# Patient Record
Sex: FEMALE | Race: OTHER | HISPANIC OR LATINO | Employment: FULL TIME | URBAN - METROPOLITAN AREA
[De-identification: names, ages, dates, MRNs, and addresses within clinical notes are randomized per-mention and may not be internally consistent; named-entity substitution may affect disease eponyms.]

---

## 2017-05-03 ENCOUNTER — TRANSCRIBE ORDERS (OUTPATIENT)
Dept: ADMINISTRATIVE | Facility: HOSPITAL | Age: 57
End: 2017-05-03

## 2017-05-03 DIAGNOSIS — N20.0 KIDNEY STONE: Primary | ICD-10-CM

## 2017-05-08 ENCOUNTER — HOSPITAL ENCOUNTER (OUTPATIENT)
Dept: ULTRASOUND IMAGING | Facility: HOSPITAL | Age: 57
Discharge: HOME | End: 2017-05-08
Attending: UROLOGY
Payer: COMMERCIAL

## 2017-05-08 DIAGNOSIS — N20.0 KIDNEY STONE: ICD-10-CM

## 2017-05-08 PROCEDURE — 76770 US EXAM ABDO BACK WALL COMP: CPT

## 2017-09-29 ENCOUNTER — CONVERSION ENCOUNTER (OUTPATIENT)
Dept: MAMMOGRAPHY | Facility: CLINIC | Age: 57
End: 2017-09-29

## 2018-01-14 NOTE — PSYCH
Behavioral Health Outpatient Intake    Referred By: DR Josue Chaidez  Intake Questions: there are no developmental disabilities  the patient does not have a hearing impairment  the patient does not have an ICM or CTT  patient is not taking injectable psychiatric medications  Employment: The patient is employed full time at [employer]  Emergency Contact Information:   Emergency Contact: Roxianne Cooks   Phone Number: [phone]   Previous Psychiatric Treatment: She has previously been seen by a psychiatrist  Ebmolly Alvarez, 2 YRS AGO  She has not been previously seen by a therapist     History: no  service  She has not had combat service  She was not activated into federal active duty as a member of the [employer] or [employer]  Insurance Subscriber: [email or web address]   Primary Insurance: [employer]   ID number: [id number]   Group number: [id number]         Presenting Problem (in patient's words): INSOMNIA, DEPRESSION, ANXIETY  Substance Abuse: NONE  Previous Treatment: The patient has not been seen here in the past      Accepted as Patient   DR Guillermo Schneider 7/18/16 @ 2:00     Primary Care Physician: DR Camryn Weinstein   Electronically signed by : Shannan Bean, ; Jan 19 2016  2:12PM EST                       (Author)

## 2018-12-07 ENCOUNTER — TRANSCRIBE ORDERS (OUTPATIENT)
Dept: ADMINISTRATIVE | Facility: HOSPITAL | Age: 58
End: 2018-12-07

## 2018-12-07 DIAGNOSIS — Z12.39 SCREENING BREAST EXAMINATION: Primary | ICD-10-CM

## 2018-12-20 ENCOUNTER — HOSPITAL ENCOUNTER (OUTPATIENT)
Dept: MAMMOGRAPHY | Facility: CLINIC | Age: 58
Discharge: HOME | End: 2018-12-20
Payer: COMMERCIAL

## 2018-12-20 VITALS — HEIGHT: 60 IN | WEIGHT: 130 LBS | BODY MASS INDEX: 25.52 KG/M2

## 2018-12-20 DIAGNOSIS — Z12.39 SCREENING BREAST EXAMINATION: ICD-10-CM

## 2018-12-20 PROCEDURE — 77067 SCR MAMMO BI INCL CAD: CPT

## 2019-10-12 ENCOUNTER — APPOINTMENT (OUTPATIENT)
Dept: LAB | Facility: HOSPITAL | Age: 59
End: 2019-10-12
Payer: COMMERCIAL

## 2019-10-12 ENCOUNTER — TRANSCRIBE ORDERS (OUTPATIENT)
Dept: ADMINISTRATIVE | Facility: HOSPITAL | Age: 59
End: 2019-10-12

## 2019-10-12 ENCOUNTER — HOSPITAL ENCOUNTER (OUTPATIENT)
Dept: RADIOLOGY | Facility: HOSPITAL | Age: 59
Discharge: HOME | End: 2019-10-12
Payer: COMMERCIAL

## 2019-10-12 DIAGNOSIS — E11.9 DIABETES MELLITUS WITHOUT COMPLICATION (HCC): ICD-10-CM

## 2019-10-12 DIAGNOSIS — R05.9 COUGH: ICD-10-CM

## 2019-10-12 DIAGNOSIS — E78.5 HYPERLIPIDEMIA, UNSPECIFIED HYPERLIPIDEMIA TYPE: ICD-10-CM

## 2019-10-12 DIAGNOSIS — E11.9 DIABETES MELLITUS WITHOUT COMPLICATION (HCC): Primary | ICD-10-CM

## 2019-10-12 LAB
ALBUMIN SERPL BCP-MCNC: 4.4 G/DL (ref 3–5.2)
ALP SERPL-CCNC: 117 U/L (ref 43–122)
ALT SERPL W P-5'-P-CCNC: 32 U/L (ref 9–52)
ANION GAP SERPL CALCULATED.3IONS-SCNC: 7 MMOL/L (ref 5–14)
AST SERPL W P-5'-P-CCNC: 26 U/L (ref 14–36)
BILIRUB SERPL-MCNC: 0.6 MG/DL
BUN SERPL-MCNC: 14 MG/DL (ref 5–25)
CALCIUM SERPL-MCNC: 9.6 MG/DL (ref 8.4–10.2)
CHLORIDE SERPL-SCNC: 101 MMOL/L (ref 97–108)
CHOLEST SERPL-MCNC: 199 MG/DL
CO2 SERPL-SCNC: 33 MMOL/L (ref 22–30)
CREAT SERPL-MCNC: 0.67 MG/DL (ref 0.6–1.2)
EST. AVERAGE GLUCOSE BLD GHB EST-MCNC: 131 MG/DL
GFR SERPL CREATININE-BSD FRML MDRD: 98 ML/MIN/1.73SQ M
GLUCOSE P FAST SERPL-MCNC: 113 MG/DL (ref 70–99)
HBA1C MFR BLD: 6.2 % (ref 4.2–6.3)
HDLC SERPL-MCNC: 49 MG/DL (ref 40–59)
LDLC SERPL CALC-MCNC: 128 MG/DL
NONHDLC SERPL-MCNC: 150 MG/DL
POTASSIUM SERPL-SCNC: 4 MMOL/L (ref 3.6–5)
PROT SERPL-MCNC: 7.5 G/DL (ref 5.9–8.4)
SODIUM SERPL-SCNC: 141 MMOL/L (ref 137–147)
TRIGL SERPL-MCNC: 109 MG/DL

## 2019-10-12 PROCEDURE — 83036 HEMOGLOBIN GLYCOSYLATED A1C: CPT

## 2019-10-12 PROCEDURE — 71046 X-RAY EXAM CHEST 2 VIEWS: CPT

## 2019-10-12 PROCEDURE — 80061 LIPID PANEL: CPT

## 2019-10-12 PROCEDURE — 80053 COMPREHEN METABOLIC PANEL: CPT

## 2019-10-12 PROCEDURE — 36415 COLL VENOUS BLD VENIPUNCTURE: CPT

## 2019-10-26 ENCOUNTER — OCCMED (OUTPATIENT)
Dept: URGENT CARE | Age: 59
End: 2019-10-26

## 2019-10-26 ENCOUNTER — APPOINTMENT (OUTPATIENT)
Dept: LAB | Age: 59
End: 2019-10-26

## 2019-10-26 DIAGNOSIS — Z00.8 ENCOUNTER FOR BIOMETRIC SCREENING: ICD-10-CM

## 2019-10-26 DIAGNOSIS — Z00.8 ENCOUNTER FOR BIOMETRIC SCREENING: Primary | ICD-10-CM

## 2019-10-26 LAB
CHOLEST SERPL-MCNC: 195 MG/DL (ref 50–200)
GLUCOSE P FAST SERPL-MCNC: 101 MG/DL (ref 65–99)
HDLC SERPL-MCNC: 49 MG/DL
LDLC SERPL CALC-MCNC: 118 MG/DL (ref 0–100)
NONHDLC SERPL-MCNC: 146 MG/DL
TRIGL SERPL-MCNC: 142 MG/DL

## 2019-10-26 PROCEDURE — 80061 LIPID PANEL: CPT

## 2019-10-26 PROCEDURE — 80323 ALKALOIDS NOS: CPT

## 2019-10-26 PROCEDURE — 82947 ASSAY GLUCOSE BLOOD QUANT: CPT

## 2019-10-26 PROCEDURE — 36415 COLL VENOUS BLD VENIPUNCTURE: CPT

## 2019-10-30 LAB
COTININE SERPL-MCNC: NORMAL NG/ML
NICOTINE SERPL-MCNC: NORMAL NG/ML

## 2019-12-02 ENCOUNTER — TRANSCRIBE ORDERS (OUTPATIENT)
Dept: ADMINISTRATIVE | Facility: HOSPITAL | Age: 59
End: 2019-12-02

## 2019-12-02 DIAGNOSIS — Z12.31 ENCOUNTER FOR SCREENING MAMMOGRAM FOR MALIGNANT NEOPLASM OF BREAST: Primary | ICD-10-CM

## 2020-03-07 ENCOUNTER — HOSPITAL ENCOUNTER (OUTPATIENT)
Dept: MAMMOGRAPHY | Facility: CLINIC | Age: 60
Discharge: HOME | End: 2020-03-07
Payer: COMMERCIAL

## 2020-03-07 VITALS — WEIGHT: 130 LBS | BODY MASS INDEX: 25.52 KG/M2 | HEIGHT: 60 IN

## 2020-03-07 DIAGNOSIS — Z12.31 ENCOUNTER FOR SCREENING MAMMOGRAM FOR MALIGNANT NEOPLASM OF BREAST: ICD-10-CM

## 2020-03-07 PROCEDURE — 77067 SCR MAMMO BI INCL CAD: CPT

## 2020-03-07 PROCEDURE — 77063 BREAST TOMOSYNTHESIS BI: CPT

## 2021-06-15 ENCOUNTER — APPOINTMENT (EMERGENCY)
Dept: CT IMAGING | Facility: HOSPITAL | Age: 61
End: 2021-06-15
Payer: COMMERCIAL

## 2021-06-15 ENCOUNTER — HOSPITAL ENCOUNTER (EMERGENCY)
Facility: HOSPITAL | Age: 61
Discharge: HOME | End: 2021-06-15
Attending: EMERGENCY MEDICINE | Admitting: EMERGENCY MEDICINE
Payer: COMMERCIAL

## 2021-06-15 VITALS
DIASTOLIC BLOOD PRESSURE: 58 MMHG | OXYGEN SATURATION: 96 % | TEMPERATURE: 98.4 F | HEART RATE: 74 BPM | SYSTOLIC BLOOD PRESSURE: 114 MMHG | RESPIRATION RATE: 18 BRPM

## 2021-06-15 DIAGNOSIS — R10.30 LOWER ABDOMINAL PAIN: Primary | ICD-10-CM

## 2021-06-15 LAB
ALBUMIN SERPL BCP-MCNC: 3.8 G/DL (ref 3.5–5)
ALP SERPL-CCNC: 159 U/L (ref 46–116)
ALT SERPL W P-5'-P-CCNC: 36 U/L (ref 12–78)
ANION GAP SERPL CALCULATED.3IONS-SCNC: 12 MMOL/L (ref 4–13)
AST SERPL W P-5'-P-CCNC: 20 U/L (ref 5–45)
BASOPHILS # BLD AUTO: 0.06 THOUSANDS/ΜL (ref 0–0.1)
BASOPHILS NFR BLD AUTO: 1 % (ref 0–1)
BILIRUB SERPL-MCNC: 0.26 MG/DL (ref 0.2–1)
BILIRUB UR QL STRIP: NEGATIVE
BUN SERPL-MCNC: 11 MG/DL (ref 5–25)
CALCIUM SERPL-MCNC: 9.8 MG/DL (ref 8.3–10.1)
CHLORIDE SERPL-SCNC: 103 MMOL/L (ref 100–108)
CLARITY UR: CLEAR
CO2 SERPL-SCNC: 27 MMOL/L (ref 21–32)
COLOR UR: YELLOW
CREAT SERPL-MCNC: 0.74 MG/DL (ref 0.6–1.3)
EOSINOPHIL # BLD AUTO: 0.36 THOUSAND/ΜL (ref 0–0.61)
EOSINOPHIL NFR BLD AUTO: 5 % (ref 0–6)
ERYTHROCYTE [DISTWIDTH] IN BLOOD BY AUTOMATED COUNT: 13.5 % (ref 11.6–15.1)
GFR SERPL CREATININE-BSD FRML MDRD: 89 ML/MIN/1.73SQ M
GLUCOSE SERPL-MCNC: 95 MG/DL (ref 65–140)
GLUCOSE UR STRIP-MCNC: NEGATIVE MG/DL
HCT VFR BLD AUTO: 42 % (ref 34.8–46.1)
HGB BLD-MCNC: 13.4 G/DL (ref 11.5–15.4)
HGB UR QL STRIP.AUTO: NEGATIVE
IMM GRANULOCYTES # BLD AUTO: 0.03 THOUSAND/UL (ref 0–0.2)
IMM GRANULOCYTES NFR BLD AUTO: 0 % (ref 0–2)
KETONES UR STRIP-MCNC: NEGATIVE MG/DL
LEUKOCYTE ESTERASE UR QL STRIP: NEGATIVE
LIPASE SERPL-CCNC: 102 U/L (ref 73–393)
LYMPHOCYTES # BLD AUTO: 2.97 THOUSANDS/ΜL (ref 0.6–4.47)
LYMPHOCYTES NFR BLD AUTO: 38 % (ref 14–44)
MCH RBC QN AUTO: 27.6 PG (ref 26.8–34.3)
MCHC RBC AUTO-ENTMCNC: 31.9 G/DL (ref 31.4–37.4)
MCV RBC AUTO: 86 FL (ref 82–98)
MONOCYTES # BLD AUTO: 0.46 THOUSAND/ΜL (ref 0.17–1.22)
MONOCYTES NFR BLD AUTO: 6 % (ref 4–12)
NEUTROPHILS # BLD AUTO: 3.95 THOUSANDS/ΜL (ref 1.85–7.62)
NEUTS SEG NFR BLD AUTO: 50 % (ref 43–75)
NITRITE UR QL STRIP: NEGATIVE
NRBC BLD AUTO-RTO: 0 /100 WBCS
PH UR STRIP.AUTO: 7 [PH] (ref 4.5–8)
PLATELET # BLD AUTO: 333 THOUSANDS/UL (ref 149–390)
PMV BLD AUTO: 10.2 FL (ref 8.9–12.7)
POTASSIUM SERPL-SCNC: 3.7 MMOL/L (ref 3.5–5.3)
PROT SERPL-MCNC: 7.4 G/DL (ref 6.4–8.2)
PROT UR STRIP-MCNC: NEGATIVE MG/DL
RBC # BLD AUTO: 4.86 MILLION/UL (ref 3.81–5.12)
SODIUM SERPL-SCNC: 142 MMOL/L (ref 136–145)
SP GR UR STRIP.AUTO: 1.02 (ref 1–1.03)
UROBILINOGEN UR QL STRIP.AUTO: 1 E.U./DL
WBC # BLD AUTO: 7.83 THOUSAND/UL (ref 4.31–10.16)

## 2021-06-15 PROCEDURE — 85025 COMPLETE CBC W/AUTO DIFF WBC: CPT | Performed by: PHYSICIAN ASSISTANT

## 2021-06-15 PROCEDURE — 81003 URINALYSIS AUTO W/O SCOPE: CPT

## 2021-06-15 PROCEDURE — 74177 CT ABD & PELVIS W/CONTRAST: CPT

## 2021-06-15 PROCEDURE — 83690 ASSAY OF LIPASE: CPT | Performed by: PHYSICIAN ASSISTANT

## 2021-06-15 PROCEDURE — 99284 EMERGENCY DEPT VISIT MOD MDM: CPT

## 2021-06-15 PROCEDURE — G1004 CDSM NDSC: HCPCS

## 2021-06-15 PROCEDURE — 96375 TX/PRO/DX INJ NEW DRUG ADDON: CPT

## 2021-06-15 PROCEDURE — 99284 EMERGENCY DEPT VISIT MOD MDM: CPT | Performed by: PHYSICIAN ASSISTANT

## 2021-06-15 PROCEDURE — 96361 HYDRATE IV INFUSION ADD-ON: CPT

## 2021-06-15 PROCEDURE — 36415 COLL VENOUS BLD VENIPUNCTURE: CPT | Performed by: PHYSICIAN ASSISTANT

## 2021-06-15 PROCEDURE — 80053 COMPREHEN METABOLIC PANEL: CPT | Performed by: PHYSICIAN ASSISTANT

## 2021-06-15 PROCEDURE — 96374 THER/PROPH/DIAG INJ IV PUSH: CPT

## 2021-06-15 RX ORDER — ALBUTEROL SULFATE 90 UG/1
2 AEROSOL, METERED RESPIRATORY (INHALATION) EVERY 4 HOURS
COMMUNITY
Start: 2021-02-11

## 2021-06-15 RX ORDER — ONDANSETRON 2 MG/ML
4 INJECTION INTRAMUSCULAR; INTRAVENOUS ONCE
Status: COMPLETED | OUTPATIENT
Start: 2021-06-15 | End: 2021-06-15

## 2021-06-15 RX ORDER — DICYCLOMINE HCL 20 MG
20 TABLET ORAL EVERY 6 HOURS
Qty: 20 TABLET | Refills: 0 | Status: SHIPPED | OUTPATIENT
Start: 2021-06-15 | End: 2022-05-24

## 2021-06-15 RX ORDER — DICYCLOMINE HCL 20 MG
20 TABLET ORAL ONCE
Status: COMPLETED | OUTPATIENT
Start: 2021-06-15 | End: 2021-06-15

## 2021-06-15 RX ORDER — TRIAMTERENE AND HYDROCHLOROTHIAZIDE 37.5; 25 MG/1; MG/1
1 CAPSULE ORAL DAILY
COMMUNITY
Start: 2015-02-23

## 2021-06-15 RX ORDER — OMEPRAZOLE 20 MG/1
20 CAPSULE, DELAYED RELEASE ORAL DAILY
COMMUNITY
Start: 2021-04-23

## 2021-06-15 RX ORDER — ATORVASTATIN CALCIUM 40 MG/1
40 TABLET, FILM COATED ORAL EVERY EVENING
COMMUNITY
Start: 2020-12-29

## 2021-06-15 RX ORDER — MORPHINE SULFATE 4 MG/ML
4 INJECTION, SOLUTION INTRAMUSCULAR; INTRAVENOUS ONCE
Status: COMPLETED | OUTPATIENT
Start: 2021-06-15 | End: 2021-06-15

## 2021-06-15 RX ADMIN — IOHEXOL 100 ML: 350 INJECTION, SOLUTION INTRAVENOUS at 14:24

## 2021-06-15 RX ADMIN — MORPHINE SULFATE 4 MG: 4 INJECTION INTRAVENOUS at 13:05

## 2021-06-15 RX ADMIN — DICYCLOMINE HYDROCHLORIDE 20 MG: 20 TABLET ORAL at 15:21

## 2021-06-15 RX ADMIN — SODIUM CHLORIDE 1000 ML: 0.9 INJECTION, SOLUTION INTRAVENOUS at 13:03

## 2021-06-15 RX ADMIN — ONDANSETRON 4 MG: 2 INJECTION INTRAMUSCULAR; INTRAVENOUS at 13:04

## 2021-06-15 NOTE — DISCHARGE INSTRUCTIONS
Results for orders placed or performed during the hospital encounter of 06/15/21   CBC and differential   Result Value Ref Range    WBC 7 83 4 31 - 10 16 Thousand/uL    RBC 4 86 3 81 - 5 12 Million/uL    Hemoglobin 13 4 11 5 - 15 4 g/dL    Hematocrit 42 0 34 8 - 46 1 %    MCV 86 82 - 98 fL    MCH 27 6 26 8 - 34 3 pg    MCHC 31 9 31 4 - 37 4 g/dL    RDW 13 5 11 6 - 15 1 %    MPV 10 2 8 9 - 12 7 fL    Platelets 677 240 - 960 Thousands/uL    nRBC 0 /100 WBCs    Neutrophils Relative 50 43 - 75 %    Immat GRANS % 0 0 - 2 %    Lymphocytes Relative 38 14 - 44 %    Monocytes Relative 6 4 - 12 %    Eosinophils Relative 5 0 - 6 %    Basophils Relative 1 0 - 1 %    Neutrophils Absolute 3 95 1 85 - 7 62 Thousands/µL    Immature Grans Absolute 0 03 0 00 - 0 20 Thousand/uL    Lymphocytes Absolute 2 97 0 60 - 4 47 Thousands/µL    Monocytes Absolute 0 46 0 17 - 1 22 Thousand/µL    Eosinophils Absolute 0 36 0 00 - 0 61 Thousand/µL    Basophils Absolute 0 06 0 00 - 0 10 Thousands/µL   CMP   Result Value Ref Range    Sodium 142 136 - 145 mmol/L    Potassium 3 7 3 5 - 5 3 mmol/L    Chloride 103 100 - 108 mmol/L    CO2 27 21 - 32 mmol/L    ANION GAP 12 4 - 13 mmol/L    BUN 11 5 - 25 mg/dL    Creatinine 0 74 0 60 - 1 30 mg/dL    Glucose 95 65 - 140 mg/dL    Calcium 9 8 8 3 - 10 1 mg/dL    AST 20 5 - 45 U/L    ALT 36 12 - 78 U/L    Alkaline Phosphatase 159 (H) 46 - 116 U/L    Total Protein 7 4 6 4 - 8 2 g/dL    Albumin 3 8 3 5 - 5 0 g/dL    Total Bilirubin 0 26 0 20 - 1 00 mg/dL    eGFR 89 ml/min/1 73sq m   Lipase   Result Value Ref Range    Lipase 102 73 - 393 u/L   Urine Macroscopic, POC   Result Value Ref Range    Color, UA Yellow     Clarity, UA Clear     pH, UA 7 0 4 5 - 8 0    Leukocytes, UA Negative Negative    Nitrite, UA Negative Negative    Protein, UA Negative Negative mg/dl    Glucose, UA Negative Negative mg/dl    Ketones, UA Negative Negative mg/dl    Urobilinogen, UA 1 0 0 2, 1 0 E U /dl E U /dl    Bilirubin, UA Negative Negative    Blood, UA Negative Negative    Specific Gravity, UA 1 020 1 003 - 1 030     CT Abdomen pelvis with contrast   Final Result   Addendum 1 of 1   ADDENDUM: Please note there is an error in the original report  The    correct finding is as follows:      REPRODUCTIVE ORGANS: Hysterectomy  No adnexal masses  Final      No acute findings to explain pain  Nonemergent findings above              Workstation performed: [id number]

## 2021-06-15 NOTE — Clinical Note
Adan Cantu was seen and treated in our emergency department on 6/15/2021  Diagnosis:     Sonal  may return to work on return date  She may return on this date: 06/17/2021         If you have any questions or concerns, please don't hesitate to call        Nicole Em PA-C    ______________________________           _______________          _______________  Hospital Representative                              Date                                Time

## 2021-06-15 NOTE — Clinical Note
Tracy Cast was seen and treated in our emergency department on 6/15/2021  Diagnosis:     Sydni Cornejo  may return to work on return date  She may return on this date: 06/17/2021         If you have any questions or concerns, please don't hesitate to call        Mari Perez PA-C    ______________________________           _______________          _______________  Hospital Representative                              Date                                Time

## 2021-06-15 NOTE — Clinical Note
Abe Tsai was seen and treated in our emergency department on 6/15/2021  Diagnosis:     Jocelyn Todd  may return to work on return date  She may return on this date: 06/17/2021         If you have any questions or concerns, please don't hesitate to call        Leda Fraser PA-C    ______________________________           _______________          _______________  Hospital Representative                              Date                                Time

## 2021-06-16 NOTE — ED PROVIDER NOTES
History  Chief Complaint   Patient presents with    Abdominal Pain     lower abd pain x days radiating into right flank and right upper quadrant, reports dizziness and inability to stand up straight, +diarrhea       Abdominal Pain  Pain location:  RLQ, LLQ and suprapubic  Pain quality: aching and cramping    Pain severity:  Moderate  Onset quality:  Gradual  Duration:  3 days  Timing:  Constant  Progression:  Unchanged  Chronicity:  New  Context: not alcohol use, not diet changes, not eating, not laxative use, not previous surgeries, not recent illness, not recent sexual activity, not retching, not sick contacts, not suspicious food intake and not trauma    Relieved by:  Nothing  Worsened by:  Nothing  Ineffective treatments:  None tried  Associated symptoms: diarrhea and nausea    Associated symptoms: no chest pain, no chills, no dysuria, no fatigue, no fever, no shortness of breath, no sore throat, no vaginal bleeding and no vomiting    Risk factors: no aspirin use, has not had multiple surgeries and no NSAID use        Prior to Admission Medications   Prescriptions Last Dose Informant Patient Reported? Taking?    Escitalopram Oxalate (LEXAPRO PO)   Yes Yes   Sig: Take by mouth   albuterol (ProAir HFA) 90 mcg/act inhaler   Yes Yes   Sig: Inhale 2 puffs every 4 (four) hours   atorvastatin (LIPITOR) 40 mg tablet   Yes Yes   Sig: Take 40 mg by mouth every evening   metFORMIN (GLUCOPHAGE) 500 mg tablet   Yes No   Sig: Take 500 mg by mouth daily   omeprazole (PriLOSEC) 20 mg delayed release capsule   Yes Yes   Sig: Take 20 mg by mouth daily   triamterene-hydrochlorothiazide (Dyazide) 37 5-25 mg per capsule   Yes Yes   Sig: Take 1 capsule by mouth daily      Facility-Administered Medications: None       Past Medical History:   Diagnosis Date    Diabetes mellitus ([facility] [address] )     Diverticulosis     HLD (hyperlipidemia)     Hypertension        Past Surgical History:   Procedure Laterality Date    APPENDECTOMY      CHOLECYSTECTOMY      HYSTERECTOMY  2001    LIVER SURGERY      OOPHORECTOMY Left 2001    REPLACEMENT TOTAL KNEE         Family History   Problem Relation Age of Onset    Prostate cancer Father 67    Prostate cancer Paternal Grandfather     Breast cancer Paternal Aunt     Prostate cancer Paternal Uncle     Prostate cancer Paternal Uncle     Cancer Paternal Aunt         blood cancer     I have reviewed and agree with the history as documented  E-Cigarette/Vaping    E-Cigarette Use Never User      E-Cigarette/Vaping Substances     Social History     Tobacco Use    Smoking status: Never Smoker    Smokeless tobacco: Never Used   Vaping Use    Vaping Use: Never used   Substance Use Topics    Alcohol use: Not Currently    Drug use: Not Currently       Review of Systems   Constitutional: Negative for chills, fatigue and fever  HENT: Negative for sore throat  Respiratory: Negative for shortness of breath  Cardiovascular: Negative for chest pain  Gastrointestinal: Positive for abdominal pain, diarrhea and nausea  Negative for vomiting  Genitourinary: Negative for dysuria and vaginal bleeding  Physical Exam  Physical Exam  Vitals and nursing note reviewed  Constitutional:       General: She is not in acute distress  Appearance: Normal appearance  She is not ill-appearing, toxic-appearing or diaphoretic  HENT:      Head: Normocephalic and atraumatic  Mouth/Throat:      Mouth: Mucous membranes are moist    Eyes:      General: No scleral icterus  Pupils: Pupils are equal, round, and reactive to light  Cardiovascular:      Rate and Rhythm: Normal rate and regular rhythm  Pulses: Normal pulses  Heart sounds: Normal heart sounds  Pulmonary:      Effort: Pulmonary effort is normal       Breath sounds: Normal breath sounds  Abdominal:      General: Abdomen is flat  There is no distension  Palpations: Abdomen is soft  Tenderness:  There is abdominal tenderness in the right lower quadrant, suprapubic area and left lower quadrant  There is no right CVA tenderness, left CVA tenderness, guarding or rebound  Negative signs include Irwin's sign and McBurney's sign  Hernia: No hernia is present  Musculoskeletal:         General: No swelling or tenderness  Normal range of motion  Cervical back: Normal range of motion  Skin:     General: Skin is warm  Capillary Refill: Capillary refill takes less than 2 seconds  Neurological:      General: No focal deficit present  Mental Status: She is alert     Psychiatric:         Mood and Affect: Mood normal          Vital Signs  ED Triage Vitals   Temperature Pulse Respirations Blood Pressure SpO2   06/15/21 1306 06/15/21 1247 06/15/21 1247 06/15/21 1247 06/15/21 1247   98 4 °F (36 9 °C) 88 18 146/69 97 %      Temp Source Heart Rate Source Patient Position - Orthostatic VS BP Location FiO2 (%)   06/15/21 1306 06/15/21 1247 06/15/21 1454 06/15/21 1454 --   Oral Monitor Lying Left arm       Pain Score       06/15/21 1305       Worst Possible Pain           Vitals:    06/15/21 1247 06/15/21 1454   BP: 146/69 114/58   Pulse: 88 74   Patient Position - Orthostatic VS:  Lying         Visual Acuity      ED Medications  Medications   morphine (PF) 4 mg/mL injection 4 mg (4 mg Intravenous Given 6/15/21 1305)   ondansetron (ZOFRAN) injection 4 mg (4 mg Intravenous Given 6/15/21 1304)   sodium chloride 0 9 % bolus 1,000 mL (0 mL Intravenous Stopped 6/15/21 1417)   iohexol (OMNIPAQUE) 350 MG/ML injection (SINGLE-DOSE) 100 mL (100 mL Intravenous Given 6/15/21 1424)   dicyclomine (BENTYL) tablet 20 mg (20 mg Oral Given 6/15/21 1521)       Diagnostic Studies  Results Reviewed     Procedure Component Value Units Date/Time    Urine Macroscopic, POC [[id number]] Collected: 06/15/21 1413    Lab Status: Final result Specimen: Urine Updated: 06/15/21 1418     Color, UA Yellow     Clarity, UA Clear     pH, UA 7 0     Leukocytes, UA Negative     Nitrite, UA Negative     Protein, UA Negative mg/dl      Glucose, UA Negative mg/dl      Ketones, UA Negative mg/dl      Urobilinogen, UA 1 0 E U /dl      Bilirubin, UA Negative     Blood, UA Negative     Specific Gravity, UA 1 020    Narrative:      CLINITEK RESULT    CMP [[id number]]  (Abnormal) Collected: 06/15/21 1303    Lab Status: Final result Specimen: Blood from Arm, Right Updated: 06/15/21 1329     Sodium 142 mmol/L      Potassium 3 7 mmol/L      Chloride 103 mmol/L      CO2 27 mmol/L      ANION GAP 12 mmol/L      BUN 11 mg/dL      Creatinine 0 74 mg/dL      Glucose 95 mg/dL      Calcium 9 8 mg/dL      AST 20 U/L      ALT 36 U/L      Alkaline Phosphatase 159 U/L      Total Protein 7 4 g/dL      Albumin 3 8 g/dL      Total Bilirubin 0 26 mg/dL      eGFR 89 ml/min/1 73sq m     Narrative:      [place] guidelines for Chronic Kidney Disease (CKD):     Stage 1 with normal or high GFR (GFR > 90 mL/min/1 73 square meters)    Stage 2 Mild CKD (GFR = 60-89 mL/min/1 73 square meters)    Stage 3A Moderate CKD (GFR = 45-59 mL/min/1 73 square meters)    Stage 3B Moderate CKD (GFR = 30-44 mL/min/1 73 square meters)    Stage 4 Severe CKD (GFR = 15-29 mL/min/1 73 square meters)    Stage 5 End Stage CKD (GFR <15 mL/min/1 73 square meters)  Note: GFR calculation is accurate only with a steady state creatinine    Lipase [[id number]]  (Normal) Collected: 06/15/21 1303    Lab Status: Final result Specimen: Blood from Arm, Right Updated: 06/15/21 1329     Lipase 102 u/L     CBC and differential [[id number]] Collected: 06/15/21 1303    Lab Status: Final result Specimen: Blood from Arm, Right Updated: 06/15/21 1312     WBC 7 83 Thousand/uL      RBC 4 86 Million/uL      Hemoglobin 13 4 g/dL      Hematocrit 42 0 %      MCV 86 fL      MCH 27 6 pg      MCHC 31 9 g/dL      RDW 13 5 %      MPV 10 2 fL      Platelets 367 Thousands/uL      nRBC 0 /100 WBCs      Neutrophils Relative 50 % Immat GRANS % 0 %      Lymphocytes Relative 38 %      Monocytes Relative 6 %      Eosinophils Relative 5 %      Basophils Relative 1 %      Neutrophils Absolute 3 95 Thousands/µL      Immature Grans Absolute 0 03 Thousand/uL      Lymphocytes Absolute 2 97 Thousands/µL      Monocytes Absolute 0 46 Thousand/µL      Eosinophils Absolute 0 36 Thousand/µL      Basophils Absolute 0 06 Thousands/µL                  CT Abdomen pelvis with contrast   Final Result by Rubens Acuna MD (06/15 1521)   Addendum 1 of 1 by Rubens Acuna MD (06/15 1521)   ADDENDUM: Please note there is an error in the original report  The    correct finding is as follows:      REPRODUCTIVE ORGANS: Hysterectomy  No adnexal masses  Final      No acute findings to explain pain  Nonemergent findings above  Workstation performed: [id number]                    Procedures  Procedures         ED Course  ED Course as of Jun 16 0609   Tue Oni 15, 2021   1329 Alk phos 159, lipase of 102                                SBIRT 20yo+      Most Recent Value   SBIRT (25 yo +)   In order to provide better care to our patients, we are screening all of our patients for alcohol and drug use  Would it be okay to ask you these screening questions? No Filed at: 06/15/2021 1254                    MDM  Number of Diagnoses or Management Options  Lower abdominal pain: new and requires workup  Diagnosis management comments: The patient was seen and examined  Laboratory studies revealed no leukocytosis   Imaging revealed no abnormalities  The patient was treated with morphine, zofran, bentyl  The patient was re-examined after treatment and disposition of discharge to home with bentyl and outpatient GI follow up was made  The test results were discussed with the patient/family  All questions were answered to patient/family's satisfaction  Anticipatory guidance and return precautions were discussed at length    They verbalized understanding and agreement with the plan  The patient remained stable while under my care in the Emergency Department  Amount and/or Complexity of Data Reviewed  Clinical lab tests: ordered and reviewed  Tests in the radiology section of CPT®: ordered and reviewed  Review and summarize past medical records: yes  Discuss the patient with other providers: yes    Risk of Complications, Morbidity, and/or Mortality  Presenting problems: moderate  Diagnostic procedures: high  Management options: moderate    Patient Progress  Patient progress: improved      Disposition  Final diagnoses:   Lower abdominal pain     Time reflects when diagnosis was documented in both MDM as applicable and the Disposition within this note     Time User Action Codes Description Comment    6/15/2021  3:32 PM Areta Money Add [R10 30] Lower abdominal pain       ED Disposition     ED Disposition Condition Date/Time Comment    Discharge Stable Tue Oni 15, 2021  3:33 PM William Lizama discharge to home/self care              Follow-up Information     Follow up With Specialties Details Why Contact Info    Joselin Laguerre MD Family Medicine   [facility] [address]   [phone]            Discharge Medication List as of 6/15/2021  3:38 PM      START taking these medications    Details   dicyclomine (BENTYL) 20 mg tablet Take 1 tablet (20 mg total) by mouth every 6 (six) hours for 7 days, Starting Tue 6/15/2021, Until Tue 6/22/2021, Normal         CONTINUE these medications which have NOT CHANGED    Details   albuterol (ProAir HFA) 90 mcg/act inhaler Inhale 2 puffs every 4 (four) hours, Starting Thu 2/11/2021, Historical Med      atorvastatin (LIPITOR) 40 mg tablet Take 40 mg by mouth every evening, Starting Tue 12/29/2020, Historical Med      Escitalopram Oxalate (LEXAPRO PO) Take by mouth, Historical Med      omeprazole (PriLOSEC) 20 mg delayed release capsule Take 20 mg by mouth daily, Starting Fri 4/23/2021, Historical Med triamterene-hydrochlorothiazide (Dyazide) 37 5-25 mg per capsule Take 1 capsule by mouth daily, Starting Mon 2/23/2015, Historical Med      metFORMIN (GLUCOPHAGE) 500 mg tablet Take 500 mg by mouth daily, Historical Med           No discharge procedures on file      PDMP Review     None          ED Provider  Electronically Signed by           Nanine Gowers, PA-C  06/16/21 8978

## 2021-10-21 ENCOUNTER — APPOINTMENT (EMERGENCY)
Dept: CT IMAGING | Facility: HOSPITAL | Age: 61
End: 2021-10-21
Payer: COMMERCIAL

## 2021-10-21 ENCOUNTER — APPOINTMENT (EMERGENCY)
Dept: RADIOLOGY | Facility: HOSPITAL | Age: 61
End: 2021-10-21
Payer: COMMERCIAL

## 2021-10-21 ENCOUNTER — HOSPITAL ENCOUNTER (EMERGENCY)
Facility: HOSPITAL | Age: 61
Discharge: HOME | End: 2021-10-22
Attending: EMERGENCY MEDICINE | Admitting: EMERGENCY MEDICINE
Payer: COMMERCIAL

## 2021-10-21 VITALS
OXYGEN SATURATION: 95 % | TEMPERATURE: 98.1 F | HEART RATE: 85 BPM | DIASTOLIC BLOOD PRESSURE: 75 MMHG | RESPIRATION RATE: 16 BRPM | BODY MASS INDEX: 28.85 KG/M2 | WEIGHT: 147.71 LBS | SYSTOLIC BLOOD PRESSURE: 145 MMHG

## 2021-10-21 DIAGNOSIS — R07.89 MUSCULOSKELETAL CHEST PAIN: Primary | ICD-10-CM

## 2021-10-21 LAB
ALBUMIN SERPL BCP-MCNC: 4 G/DL (ref 3.5–5)
ALP SERPL-CCNC: 135 U/L (ref 46–116)
ALT SERPL W P-5'-P-CCNC: 51 U/L (ref 12–78)
ANION GAP SERPL CALCULATED.3IONS-SCNC: 10 MMOL/L (ref 4–13)
AST SERPL W P-5'-P-CCNC: 29 U/L (ref 5–45)
ATRIAL RATE: 84 BPM
BASOPHILS # BLD AUTO: 0.08 THOUSANDS/ΜL (ref 0–0.1)
BASOPHILS NFR BLD AUTO: 1 % (ref 0–1)
BILIRUB SERPL-MCNC: 0.21 MG/DL (ref 0.2–1)
BUN SERPL-MCNC: 18 MG/DL (ref 5–25)
CALCIUM SERPL-MCNC: 9.3 MG/DL (ref 8.3–10.1)
CHLORIDE SERPL-SCNC: 102 MMOL/L (ref 100–108)
CO2 SERPL-SCNC: 28 MMOL/L (ref 21–32)
CREAT SERPL-MCNC: 0.74 MG/DL (ref 0.6–1.3)
D DIMER PPP FEU-MCNC: 1.29 UG/ML FEU
EOSINOPHIL # BLD AUTO: 0.4 THOUSAND/ΜL (ref 0–0.61)
EOSINOPHIL NFR BLD AUTO: 4 % (ref 0–6)
ERYTHROCYTE [DISTWIDTH] IN BLOOD BY AUTOMATED COUNT: 13.4 % (ref 11.6–15.1)
GFR SERPL CREATININE-BSD FRML MDRD: 89 ML/MIN/1.73SQ M
GLUCOSE SERPL-MCNC: 151 MG/DL (ref 65–140)
HCT VFR BLD AUTO: 42.7 % (ref 34.8–46.1)
HGB BLD-MCNC: 13.6 G/DL (ref 11.5–15.4)
IMM GRANULOCYTES # BLD AUTO: 0.02 THOUSAND/UL (ref 0–0.2)
IMM GRANULOCYTES NFR BLD AUTO: 0 % (ref 0–2)
LIPASE SERPL-CCNC: 133 U/L (ref 73–393)
LYMPHOCYTES # BLD AUTO: 3.63 THOUSANDS/ΜL (ref 0.6–4.47)
LYMPHOCYTES NFR BLD AUTO: 36 % (ref 14–44)
MCH RBC QN AUTO: 27.4 PG (ref 26.8–34.3)
MCHC RBC AUTO-ENTMCNC: 31.9 G/DL (ref 31.4–37.4)
MCV RBC AUTO: 86 FL (ref 82–98)
MONOCYTES # BLD AUTO: 0.68 THOUSAND/ΜL (ref 0.17–1.22)
MONOCYTES NFR BLD AUTO: 7 % (ref 4–12)
NEUTROPHILS # BLD AUTO: 5.26 THOUSANDS/ΜL (ref 1.85–7.62)
NEUTS SEG NFR BLD AUTO: 52 % (ref 43–75)
NRBC BLD AUTO-RTO: 0 /100 WBCS
P AXIS: 46 DEGREES
PLATELET # BLD AUTO: 320 THOUSANDS/UL (ref 149–390)
PMV BLD AUTO: 10.3 FL (ref 8.9–12.7)
POTASSIUM SERPL-SCNC: 3.5 MMOL/L (ref 3.5–5.3)
PR INTERVAL: 136 MS
PROT SERPL-MCNC: 7.9 G/DL (ref 6.4–8.2)
QRS AXIS: 74 DEGREES
QRSD INTERVAL: 102 MS
QT INTERVAL: 390 MS
QTC INTERVAL: 460 MS
RBC # BLD AUTO: 4.97 MILLION/UL (ref 3.81–5.12)
SODIUM SERPL-SCNC: 140 MMOL/L (ref 136–145)
T WAVE AXIS: 47 DEGREES
TROPONIN I SERPL-MCNC: <0.02 NG/ML
VENTRICULAR RATE: 84 BPM
WBC # BLD AUTO: 10.07 THOUSAND/UL (ref 4.31–10.16)

## 2021-10-21 PROCEDURE — 83690 ASSAY OF LIPASE: CPT

## 2021-10-21 PROCEDURE — 36415 COLL VENOUS BLD VENIPUNCTURE: CPT

## 2021-10-21 PROCEDURE — 85025 COMPLETE CBC W/AUTO DIFF WBC: CPT

## 2021-10-21 PROCEDURE — 71275 CT ANGIOGRAPHY CHEST: CPT

## 2021-10-21 PROCEDURE — 85379 FIBRIN DEGRADATION QUANT: CPT

## 2021-10-21 PROCEDURE — 99284 EMERGENCY DEPT VISIT MOD MDM: CPT

## 2021-10-21 PROCEDURE — 93005 ELECTROCARDIOGRAM TRACING: CPT

## 2021-10-21 PROCEDURE — 84484 ASSAY OF TROPONIN QUANT: CPT

## 2021-10-21 PROCEDURE — 96374 THER/PROPH/DIAG INJ IV PUSH: CPT

## 2021-10-21 PROCEDURE — 80053 COMPREHEN METABOLIC PANEL: CPT

## 2021-10-21 PROCEDURE — 99285 EMERGENCY DEPT VISIT HI MDM: CPT | Performed by: EMERGENCY MEDICINE

## 2021-10-21 PROCEDURE — 74177 CT ABD & PELVIS W/CONTRAST: CPT

## 2021-10-21 PROCEDURE — 71045 X-RAY EXAM CHEST 1 VIEW: CPT

## 2021-10-21 PROCEDURE — 93010 ELECTROCARDIOGRAM REPORT: CPT | Performed by: INTERNAL MEDICINE

## 2021-10-21 RX ORDER — MAGNESIUM HYDROXIDE/ALUMINUM HYDROXICE/SIMETHICONE 120; 1200; 1200 MG/30ML; MG/30ML; MG/30ML
30 SUSPENSION ORAL ONCE
Status: COMPLETED | OUTPATIENT
Start: 2021-10-21 | End: 2021-10-21

## 2021-10-21 RX ORDER — MAGNESIUM SULFATE HEPTAHYDRATE 40 MG/ML
2 INJECTION, SOLUTION INTRAVENOUS ONCE
Status: DISCONTINUED | OUTPATIENT
Start: 2021-10-21 | End: 2021-10-21

## 2021-10-21 RX ORDER — KETOROLAC TROMETHAMINE 30 MG/ML
30 INJECTION, SOLUTION INTRAMUSCULAR; INTRAVENOUS ONCE
Status: COMPLETED | OUTPATIENT
Start: 2021-10-21 | End: 2021-10-21

## 2021-10-21 RX ORDER — METOCLOPRAMIDE HYDROCHLORIDE 5 MG/ML
10 INJECTION INTRAMUSCULAR; INTRAVENOUS ONCE
Status: DISCONTINUED | OUTPATIENT
Start: 2021-10-21 | End: 2021-10-21

## 2021-10-21 RX ORDER — LIDOCAINE HYDROCHLORIDE 20 MG/ML
15 SOLUTION OROPHARYNGEAL ONCE
Status: COMPLETED | OUTPATIENT
Start: 2021-10-21 | End: 2021-10-21

## 2021-10-21 RX ORDER — KETOROLAC TROMETHAMINE 30 MG/ML
15 INJECTION, SOLUTION INTRAMUSCULAR; INTRAVENOUS ONCE
Status: DISCONTINUED | OUTPATIENT
Start: 2021-10-21 | End: 2021-10-21

## 2021-10-21 RX ORDER — DOCUSATE SODIUM 100 MG/1
100 CAPSULE, LIQUID FILLED ORAL ONCE
Status: DISCONTINUED | OUTPATIENT
Start: 2021-10-21 | End: 2021-10-21

## 2021-10-21 RX ADMIN — KETOROLAC TROMETHAMINE 30 MG: 30 INJECTION, SOLUTION INTRAMUSCULAR at 22:17

## 2021-10-21 RX ADMIN — LIDOCAINE HYDROCHLORIDE 15 ML: 20 SOLUTION ORAL; TOPICAL at 21:08

## 2021-10-21 RX ADMIN — IOHEXOL 100 ML: 350 INJECTION, SOLUTION INTRAVENOUS at 22:28

## 2021-10-21 RX ADMIN — ALUMINA, MAGNESIA, AND SIMETHICONE ORAL SUSPENSION REGULAR STRENGTH 30 ML: 1200; 1200; 120 SUSPENSION ORAL at 21:08

## 2022-05-24 ENCOUNTER — HOSPITAL ENCOUNTER (EMERGENCY)
Facility: HOSPITAL | Age: 62
Discharge: HOME | End: 2022-05-24
Attending: EMERGENCY MEDICINE | Admitting: EMERGENCY MEDICINE
Payer: COMMERCIAL

## 2022-05-24 ENCOUNTER — APPOINTMENT (EMERGENCY)
Dept: CT IMAGING | Facility: HOSPITAL | Age: 62
End: 2022-05-24
Payer: COMMERCIAL

## 2022-05-24 VITALS
TEMPERATURE: 98.2 F | BODY MASS INDEX: 28.85 KG/M2 | WEIGHT: 147.71 LBS | HEART RATE: 76 BPM | OXYGEN SATURATION: 96 % | RESPIRATION RATE: 16 BRPM | SYSTOLIC BLOOD PRESSURE: 123 MMHG | DIASTOLIC BLOOD PRESSURE: 61 MMHG

## 2022-05-24 DIAGNOSIS — R79.89 ELEVATED LFTS: ICD-10-CM

## 2022-05-24 DIAGNOSIS — K76.0 FATTY LIVER: ICD-10-CM

## 2022-05-24 DIAGNOSIS — K59.00 CONSTIPATION: ICD-10-CM

## 2022-05-24 DIAGNOSIS — K57.90 DIVERTICULOSIS: ICD-10-CM

## 2022-05-24 DIAGNOSIS — R10.32 LLQ ABDOMINAL PAIN: Primary | ICD-10-CM

## 2022-05-24 LAB
ALBUMIN SERPL BCP-MCNC: 3.8 G/DL (ref 3.5–5)
ALP SERPL-CCNC: 124 U/L (ref 46–116)
ALT SERPL W P-5'-P-CCNC: 112 U/L (ref 12–78)
ANION GAP SERPL CALCULATED.3IONS-SCNC: 8 MMOL/L (ref 4–13)
AST SERPL W P-5'-P-CCNC: 80 U/L (ref 5–45)
BASOPHILS # BLD AUTO: 0.04 THOUSANDS/ΜL (ref 0–0.1)
BASOPHILS NFR BLD AUTO: 1 % (ref 0–1)
BILIRUB SERPL-MCNC: 0.34 MG/DL (ref 0.2–1)
BILIRUB UR QL STRIP: NEGATIVE
BUN SERPL-MCNC: 11 MG/DL (ref 5–25)
CALCIUM SERPL-MCNC: 9.3 MG/DL (ref 8.3–10.1)
CHLORIDE SERPL-SCNC: 103 MMOL/L (ref 100–108)
CLARITY UR: CLEAR
CO2 SERPL-SCNC: 28 MMOL/L (ref 21–32)
COLOR UR: YELLOW
COLOR, POC: YELLOW
CREAT SERPL-MCNC: 0.61 MG/DL (ref 0.6–1.3)
EOSINOPHIL # BLD AUTO: 0.26 THOUSAND/ΜL (ref 0–0.61)
EOSINOPHIL NFR BLD AUTO: 4 % (ref 0–6)
ERYTHROCYTE [DISTWIDTH] IN BLOOD BY AUTOMATED COUNT: 13.3 % (ref 11.6–15.1)
FLUAV RNA RESP QL NAA+PROBE: NEGATIVE
FLUBV RNA RESP QL NAA+PROBE: NEGATIVE
GFR SERPL CREATININE-BSD FRML MDRD: 98 ML/MIN/1.73SQ M
GLUCOSE SERPL-MCNC: 99 MG/DL (ref 65–140)
GLUCOSE UR STRIP-MCNC: NEGATIVE MG/DL
HCT VFR BLD AUTO: 43.3 % (ref 34.8–46.1)
HGB BLD-MCNC: 13.5 G/DL (ref 11.5–15.4)
HGB UR QL STRIP.AUTO: NEGATIVE
IMM GRANULOCYTES # BLD AUTO: 0.02 THOUSAND/UL (ref 0–0.2)
IMM GRANULOCYTES NFR BLD AUTO: 0 % (ref 0–2)
KETONES UR STRIP-MCNC: NEGATIVE MG/DL
LEUKOCYTE ESTERASE UR QL STRIP: NEGATIVE
LIPASE SERPL-CCNC: 81 U/L (ref 73–393)
LYMPHOCYTES # BLD AUTO: 2.26 THOUSANDS/ΜL (ref 0.6–4.47)
LYMPHOCYTES NFR BLD AUTO: 35 % (ref 14–44)
MCH RBC QN AUTO: 26.6 PG (ref 26.8–34.3)
MCHC RBC AUTO-ENTMCNC: 31.2 G/DL (ref 31.4–37.4)
MCV RBC AUTO: 85 FL (ref 82–98)
MONOCYTES # BLD AUTO: 0.49 THOUSAND/ΜL (ref 0.17–1.22)
MONOCYTES NFR BLD AUTO: 8 % (ref 4–12)
NEUTROPHILS # BLD AUTO: 3.35 THOUSANDS/ΜL (ref 1.85–7.62)
NEUTS SEG NFR BLD AUTO: 52 % (ref 43–75)
NITRITE UR QL STRIP: NEGATIVE
NRBC BLD AUTO-RTO: 0 /100 WBCS
PH UR STRIP.AUTO: 7.5 [PH] (ref 4.5–8)
PLATELET # BLD AUTO: 337 THOUSANDS/UL (ref 149–390)
PMV BLD AUTO: 10.7 FL (ref 8.9–12.7)
POTASSIUM SERPL-SCNC: 3.8 MMOL/L (ref 3.5–5.3)
PROT SERPL-MCNC: 7.4 G/DL (ref 6.4–8.2)
PROT UR STRIP-MCNC: NEGATIVE MG/DL
RBC # BLD AUTO: 5.08 MILLION/UL (ref 3.81–5.12)
RSV RNA RESP QL NAA+PROBE: NEGATIVE
SARS-COV-2 RNA RESP QL NAA+PROBE: NEGATIVE
SODIUM SERPL-SCNC: 139 MMOL/L (ref 136–145)
SP GR UR STRIP.AUTO: 1.02 (ref 1–1.03)
UROBILINOGEN UR QL STRIP.AUTO: 1 E.U./DL
WBC # BLD AUTO: 6.42 THOUSAND/UL (ref 4.31–10.16)

## 2022-05-24 PROCEDURE — 0241U HB NFCT DS VIR RESP RNA 4 TRGT: CPT | Performed by: PHYSICIAN ASSISTANT

## 2022-05-24 PROCEDURE — 74176 CT ABD & PELVIS W/O CONTRAST: CPT

## 2022-05-24 PROCEDURE — 96361 HYDRATE IV INFUSION ADD-ON: CPT

## 2022-05-24 PROCEDURE — G1004 CDSM NDSC: HCPCS

## 2022-05-24 PROCEDURE — 99284 EMERGENCY DEPT VISIT MOD MDM: CPT | Performed by: PHYSICIAN ASSISTANT

## 2022-05-24 PROCEDURE — 83690 ASSAY OF LIPASE: CPT | Performed by: PHYSICIAN ASSISTANT

## 2022-05-24 PROCEDURE — 36415 COLL VENOUS BLD VENIPUNCTURE: CPT | Performed by: PHYSICIAN ASSISTANT

## 2022-05-24 PROCEDURE — 99284 EMERGENCY DEPT VISIT MOD MDM: CPT

## 2022-05-24 PROCEDURE — 85025 COMPLETE CBC W/AUTO DIFF WBC: CPT | Performed by: PHYSICIAN ASSISTANT

## 2022-05-24 PROCEDURE — 81003 URINALYSIS AUTO W/O SCOPE: CPT

## 2022-05-24 PROCEDURE — 96374 THER/PROPH/DIAG INJ IV PUSH: CPT

## 2022-05-24 PROCEDURE — 80053 COMPREHEN METABOLIC PANEL: CPT | Performed by: PHYSICIAN ASSISTANT

## 2022-05-24 PROCEDURE — 96375 TX/PRO/DX INJ NEW DRUG ADDON: CPT

## 2022-05-24 RX ORDER — MORPHINE SULFATE 4 MG/ML
4 INJECTION, SOLUTION INTRAMUSCULAR; INTRAVENOUS ONCE
Status: COMPLETED | OUTPATIENT
Start: 2022-05-24 | End: 2022-05-24

## 2022-05-24 RX ORDER — DICYCLOMINE HCL 20 MG
20 TABLET ORAL ONCE
Status: COMPLETED | OUTPATIENT
Start: 2022-05-24 | End: 2022-05-24

## 2022-05-24 RX ORDER — DICYCLOMINE HCL 20 MG
20 TABLET ORAL 2 TIMES DAILY
Qty: 20 TABLET | Refills: 0 | Status: SHIPPED | OUTPATIENT
Start: 2022-05-24

## 2022-05-24 RX ORDER — ONDANSETRON 2 MG/ML
4 INJECTION INTRAMUSCULAR; INTRAVENOUS ONCE
Status: COMPLETED | OUTPATIENT
Start: 2022-05-24 | End: 2022-05-24

## 2022-05-24 RX ADMIN — ONDANSETRON 4 MG: 2 INJECTION INTRAMUSCULAR; INTRAVENOUS at 12:43

## 2022-05-24 RX ADMIN — MORPHINE SULFATE 4 MG: 4 INJECTION INTRAVENOUS at 12:45

## 2022-05-24 RX ADMIN — DICYCLOMINE HYDROCHLORIDE 20 MG: 20 TABLET ORAL at 15:11

## 2022-05-24 RX ADMIN — MORPHINE SULFATE 2 MG: 2 INJECTION, SOLUTION INTRAMUSCULAR; INTRAVENOUS at 15:12

## 2022-05-24 RX ADMIN — SODIUM CHLORIDE 1000 ML: 0.9 INJECTION, SOLUTION INTRAVENOUS at 12:42

## 2022-05-24 NOTE — ED PROVIDER NOTES
History  Chief Complaint   Patient presents with    Abdominal Pain     Left sided abdominal pain and back pain x 4 days  Pt reports having diverticulitis hx  Pt denies urinary symptoms  Pt reports generalized body aches  Pt denies fevers but reports chills      Patient is a 26-year-old female with past medical history of hypertension, hyperlipidemia, diabetes mellitus, GERD, diverticulitis, irritable bowel syndrome, depression, anxiety who presents for evaluation of left-sided abdominal and left low back pain  She states symptoms started 5/19/22 however she was on vacation in [place]  She states that she has had associated subjective fever, chills, body aches and fatigue  She states that she was suffering from constipation however she had multiple large bowel movements 5/22/22 that she states were dark/black in color  She states she had a normal bowel movement yesterday that was brown in color  Denies other bright red blood or melana  She denies taking pepto-bismol or iron pills or other new medications  She has not had any recent episodes of diverticulitis  No recent abx  No hx of hospitalizations or surgeries for diverticulitis  She denies injury/trauma  She denies chest pain, shortness of breath, dysuria, hematuria, frequency, dizziness, syncope, numbness, weakness  She is not on blood thinners  Prior to Admission Medications   Prescriptions Last Dose Informant Patient Reported? Taking?    Escitalopram Oxalate (LEXAPRO PO)   Yes Yes   Sig: Take by mouth   albuterol (PROVENTIL HFA,VENTOLIN HFA) 90 mcg/act inhaler   Yes Yes   Sig: Inhale 2 puffs every 4 (four) hours   atorvastatin (LIPITOR) 40 mg tablet Not Taking at Unknown time  Yes No   Sig: Take 40 mg by mouth every evening   Patient not taking: Reported on 5/24/2022   metFORMIN (GLUCOPHAGE) 500 mg tablet   Yes Yes   Sig: Take 500 mg by mouth daily   omeprazole (PriLOSEC) 20 mg delayed release capsule   Yes Yes   Sig: Take 20 mg by mouth daily triamterene-hydrochlorothiazide (DYAZIDE) 37 5-25 mg per capsule   Yes Yes   Sig: Take 1 capsule by mouth daily      Facility-Administered Medications: None       Past Medical History:   Diagnosis Date    Diabetes mellitus ([address] )     Diverticulosis     HLD (hyperlipidemia)     Hypertension        Past Surgical History:   Procedure Laterality Date    APPENDECTOMY      CHOLECYSTECTOMY      HYSTERECTOMY  2001    LIVER SURGERY      OOPHORECTOMY Left 2001    REPLACEMENT TOTAL KNEE         Family History   Problem Relation Age of Onset    Prostate cancer Father 67    Prostate cancer Paternal Grandfather     Breast cancer Paternal Aunt     Prostate cancer Paternal Uncle     Prostate cancer Paternal Uncle     Cancer Paternal Aunt         blood cancer     I have reviewed and agree with the history as documented  E-Cigarette/Vaping    E-Cigarette Use Never User      E-Cigarette/Vaping Substances     Social History     Tobacco Use    Smoking status: Never Smoker    Smokeless tobacco: Never Used   Vaping Use    Vaping Use: Never used   Substance Use Topics    Alcohol use: Not Currently    Drug use: Not Currently       Review of Systems   Constitutional: Positive for chills and fever  HENT: Negative for congestion, ear pain, rhinorrhea and sore throat  Respiratory: Negative for cough and shortness of breath  Cardiovascular: Negative for chest pain  Gastrointestinal: Positive for abdominal pain, constipation and nausea  Negative for vomiting  Genitourinary: Negative for difficulty urinating, dysuria, flank pain, frequency and hematuria  Musculoskeletal: Positive for myalgias  Negative for back pain  Neurological: Negative for dizziness, syncope and headaches  All other systems reviewed and are negative  Physical Exam  Physical Exam  Vitals and nursing note reviewed  Exam conducted with a chaperone present  Constitutional:       General: She is not in acute distress  Appearance: She is well-developed and normal weight  She is not ill-appearing, toxic-appearing or diaphoretic  HENT:      Head: Normocephalic and atraumatic  Right Ear: External ear normal       Left Ear: External ear normal    Eyes:      Conjunctiva/sclera: Conjunctivae normal    Cardiovascular:      Rate and Rhythm: Normal rate and regular rhythm  Heart sounds: Normal heart sounds  No murmur heard  Pulmonary:      Effort: Pulmonary effort is normal  No respiratory distress  Breath sounds: Normal breath sounds  No stridor  No wheezing or rhonchi  Abdominal:      General: Abdomen is flat  Bowel sounds are normal  There is no distension  Palpations: Abdomen is soft  Tenderness: There is abdominal tenderness in the left lower quadrant  There is no guarding  Genitourinary:     Comments: Rectal exam- heme negative   Musculoskeletal:         General: Normal range of motion  Cervical back: Normal range of motion  Skin:     General: Skin is warm and dry  Neurological:      Mental Status: She is alert     Psychiatric:         Mood and Affect: Mood normal          Vital Signs  ED Triage Vitals   Temperature Pulse Respirations Blood Pressure SpO2   05/24/22 1045 05/24/22 1045 05/24/22 1045 05/24/22 1045 05/24/22 1045   98 2 °F (36 8 °C) 87 16 125/79 98 %      Temp Source Heart Rate Source Patient Position - Orthostatic VS BP Location FiO2 (%)   05/24/22 1045 05/24/22 1045 05/24/22 1045 05/24/22 1045 --   Oral Monitor Sitting Right arm       Pain Score       05/24/22 1245       9           Vitals:    05/24/22 1045 05/24/22 1345 05/24/22 1520   BP: 125/79 126/62 123/61   Pulse: 87 69 76   Patient Position - Orthostatic VS: Sitting Lying Lying         Visual Acuity      ED Medications  Medications   sodium chloride 0 9 % bolus 1,000 mL (0 mL Intravenous Stopped 5/24/22 1350)   ondansetron (ZOFRAN) injection 4 mg (4 mg Intravenous Given 5/24/22 1243)   morphine (PF) 4 mg/mL injection 4 mg (4 mg Intravenous Given 5/24/22 1245)   dicyclomine (BENTYL) tablet 20 mg (20 mg Oral Given 5/24/22 1511)   morphine injection 2 mg (2 mg Intravenous Given 5/24/22 1512)       Diagnostic Studies  Results Reviewed     Procedure Component Value Units Date/Time    COVID/FLU/RSV [[id number]]  (Normal) Collected: 05/24/22 1351    Lab Status: Final result Specimen: Nares from Nasopharyngeal Swab Updated: 05/24/22 1441     SARS-CoV-2 Negative     INFLUENZA A PCR Negative     INFLUENZA B PCR Negative     RSV PCR Negative    Narrative:      FOR PEDIATRIC PATIENTS - copy/paste COVID Guidelines URL to browser: [email or web address]    SARS-CoV-2 assay is a Nucleic Acid Amplification assay intended for the  qualitative detection of nucleic acid from SARS-CoV-2 in nasopharyngeal  swabs  Results are for the presumptive identification of SARS-CoV-2 RNA  Positive results are indicative of infection with SARS-CoV-2, the virus  causing COVID-19, but do not rule out bacterial infection or co-infection  with other viruses  Laboratories within the [place] and its  territories are required to report all positive results to the appropriate  public health authorities  Negative results do not preclude SARS-CoV-2  infection and should not be used as the sole basis for treatment or other  patient management decisions  Negative results must be combined with  clinical observations, patient history, and epidemiological information  This test has not been FDA cleared or approved  This test has been authorized by FDA under an Emergency Use Authorization  (EUA)  This test is only authorized for the duration of time the  declaration that circumstances exist justifying the authorization of the  emergency use of an in vitro diagnostic tests for detection of SARS-CoV-2  virus and/or diagnosis of COVID-19 infection under section 564(b)(1) of  the Act, 21 U  S C  798IIU-9(N)(2), unless the authorization is terminated  or revoked sooner  The test has been validated but independent review by FDA  and CLIA is pending  Test performed using [employer] GeneXpert: This RT-PCR assay targets N2,  a region unique to SARS-CoV-2  A conserved region in the E-gene was chosen  for pan-Sarbecovirus detection which includes SARS-CoV-2      Comprehensive metabolic panel [[id number]]  (Abnormal) Collected: 05/24/22 1252    Lab Status: Final result Specimen: Blood from Arm, Right Updated: 05/24/22 1318     Sodium 139 mmol/L      Potassium 3 8 mmol/L      Chloride 103 mmol/L      CO2 28 mmol/L      ANION GAP 8 mmol/L      BUN 11 mg/dL      Creatinine 0 61 mg/dL      Glucose 99 mg/dL      Calcium 9 3 mg/dL      AST 80 U/L       U/L      Alkaline Phosphatase 124 U/L      Total Protein 7 4 g/dL      Albumin 3 8 g/dL      Total Bilirubin 0 34 mg/dL      eGFR 98 ml/min/1 73sq m     Narrative:      [place] guidelines for Chronic Kidney Disease (CKD):     Stage 1 with normal or high GFR (GFR > 90 mL/min/1 73 square meters)    Stage 2 Mild CKD (GFR = 60-89 mL/min/1 73 square meters)    Stage 3A Moderate CKD (GFR = 45-59 mL/min/1 73 square meters)    Stage 3B Moderate CKD (GFR = 30-44 mL/min/1 73 square meters)    Stage 4 Severe CKD (GFR = 15-29 mL/min/1 73 square meters)    Stage 5 End Stage CKD (GFR <15 mL/min/1 73 square meters)  Note: GFR calculation is accurate only with a steady state creatinine    Lipase [[id number]]  (Normal) Collected: 05/24/22 1252    Lab Status: Final result Specimen: Blood from Arm, Right Updated: 05/24/22 1318     Lipase 81 u/L     POCT urinalysis dipstick [[id number]]  (Normal) Resulted: 05/24/22 1318    Lab Status: Final result Specimen: Urine Updated: 05/24/22 1318     Color, UA yellow    CBC and differential [[id number]]  (Abnormal) Collected: 05/24/22 1252    Lab Status: Final result Specimen: Blood from Arm, Right Updated: 05/24/22 1258     WBC 6 42 Thousand/uL      RBC 5 08 Million/uL      Hemoglobin 13 5 g/dL      Hematocrit 43 3 %      MCV 85 fL      MCH 26 6 pg      MCHC 31 2 g/dL      RDW 13 3 %      MPV 10 7 fL      Platelets 236 Thousands/uL      nRBC 0 /100 WBCs      Neutrophils Relative 52 %      Immat GRANS % 0 %      Lymphocytes Relative 35 %      Monocytes Relative 8 %      Eosinophils Relative 4 %      Basophils Relative 1 %      Neutrophils Absolute 3 35 Thousands/µL      Immature Grans Absolute 0 02 Thousand/uL      Lymphocytes Absolute 2 26 Thousands/µL      Monocytes Absolute 0 49 Thousand/µL      Eosinophils Absolute 0 26 Thousand/µL      Basophils Absolute 0 04 Thousands/µL     Urine Macroscopic, POC [[id number]] Collected: 05/24/22 1243    Lab Status: Final result Specimen: Urine Updated: 05/24/22 1244     Color, UA Yellow     Clarity, UA Clear     pH, UA 7 5     Leukocytes, UA Negative     Nitrite, UA Negative     Protein, UA Negative mg/dl      Glucose, UA Negative mg/dl      Ketones, UA Negative mg/dl      Urobilinogen, UA 1 0 E U /dl      Bilirubin, UA Negative     Blood, UA Negative     Specific Gravity, UA 1 025    Narrative:      CL[facility] RESULT                 CT abdomen pelvis wo contrast   Final Result by Phoenix Waters MD (05/24 1402)   1  Diverticular disease without evidence of diverticulitis  2   No acute inflammatory or infectious process  3   Punctate right-sided nonobstructive nephrolithiasis without further urinary calculi  Workstation performed: [id number]                    Procedures  Procedures         ED Course                                             MDM  Number of Diagnoses or Management Options  Constipation  Diverticulosis  Elevated LFTs  Fatty liver  LLQ abdominal pain  Diagnosis management comments: Plan- will check basic labs, covid/flu/rsv, UA, CT scan abd/pelvis  Will give IVF, zofran, morphine  Labs without leukocytosis  Mild elevation LFTs    Patient has had some elevated LFTs in the past   She also notes that she has a history of fatty liver as well  UA clean  COVID/flu/RSV negative  CT scan abdomen pelvis- 1  Diverticular disease without evidence of diverticulitis  2   No acute inflammatory or infectious process  3   Punctate right-sided nonobstructive nephrolithiasis without further urinary calculi      Discussed all with patient  She states that she did feel some improvement in pain after the medications here  Discussed likely constipation contributing to her left-sided abdominal pain as no signs of diverticulitis or acute inflammatory/infectious process was seen on the CT scan  Patient does note that she has been suffering from IBS and constipation for a while  Her doctor at 1 point put her on Arianna [place] however she has not been taking this  She has not followed up with her GI doctor in a few years  Will prescribe Bentyl  Also discussed other bowel regimens to help with her constipation  Given contact information for [facility] gastroenterology for her to call to set up an appointment if she is unable to follow up with her GI doctor  Discussed strict return precautions if symptoms worsen or new symptoms arise  Patient states understanding and agrees with plan         Amount and/or Complexity of Data Reviewed  Clinical lab tests: ordered and reviewed  Tests in the radiology section of CPT®: ordered and reviewed    Patient Progress  Patient progress: stable      Disposition  Final diagnoses:   LLQ abdominal pain   Diverticulosis   Constipation   Elevated LFTs   Fatty liver     Time reflects when diagnosis was documented in both MDM as applicable and the Disposition within this note     Time User Action Codes Description Comment    5/24/2022  2:57 PM Evelyn Curiel Add [R10 32] LLQ abdominal pain     5/24/2022  2:57 PM Evelyn Curiel Add [K57 90] Diverticulosis     5/24/2022  2:57 PM Evelyn Curiel Add [K59 00] Constipation     5/24/2022  2:57 PM Evelyn Curiel Add [R79 89] Elevated LFTs     5/24/2022  3:01 PM Milind Brisk Add [K76 0] Fatty liver       ED Disposition     ED Disposition   Discharge    Condition   Stable    Date/Time   Tue May 24, 2022  2:57 PM    [address] discharge to home/self care                 Follow-up Information     Follow up With Specialties Details Why Contact Info Additional Information    Keily French MD Family Medicine Schedule an appointment as soon as possible for a visit in 1 day  [address]       [facility] Schedule an appointment as soon as possible for a visit in 1 day Follow up for left lower quadrant abdominal pain, diverticulosis, elevated LFT, constipation [address]  [address] [facility] [address]  Jessica Macias [address] [facility], [address], [facility], [place], [address]   [facility] Emergency Department Emergency Medicine  If symptoms worsen [facility] [id number]  [address] [place] [address] Emergency Department, [address] [facility] , [place], [place], [address]          Discharge Medication List as of 5/24/2022  3:03 PM      START taking these medications    Details   dicyclomine (BENTYL) 20 mg tablet Take 1 tablet (20 mg total) by mouth in the morning and 1 tablet (20 mg total) in the evening , Starting Tue 5/24/2022, Normal         CONTINUE these medications which have NOT CHANGED    Details   albuterol (PROVENTIL HFA,VENTOLIN HFA) 90 mcg/act inhaler Inhale 2 puffs every 4 (four) hours, Starting Thu 2/11/2021, Historical Med      Escitalopram Oxalate (LEXAPRO PO) Take by mouth, Historical Med      metFORMIN (GLUCOPHAGE) 500 mg tablet Take 500 mg by mouth daily, Historical Med      omeprazole (PriLOSEC) 20 mg delayed release capsule Take 20 mg by mouth daily, Starting Fri 4/23/2021, Historical Med triamterene-hydrochlorothiazide (DYAZIDE) 37 5-25 mg per capsule Take 1 capsule by mouth daily, Starting Mon 2/23/2015, Historical Med      atorvastatin (LIPITOR) 40 mg tablet Take 40 mg by mouth every evening, Starting Tue 12/29/2020, Historical Med             No discharge procedures on file      PDMP Review     None          ED Provider  Electronically Signed by           Adrian Stanley PA-C  05/24/22 9533

## 2022-05-24 NOTE — Clinical Note
Anel Rand was seen and treated in our emergency department on 5/24/2022  Diagnosis:     Desmond Munroe  may return to work on return date  She may return on this date: 05/27/2022         If you have any questions or concerns, please don't hesitate to call        Adi Simmons PA-C    ______________________________           _______________          _______________  Hospital Representative                              Date                                Time

## 2022-05-24 NOTE — ED NOTES
Patient is waiting for transport home  Spouse is to arrive around 1700 to bring her home        Frank Dunne RN  05/24/22 5281

## 2022-11-21 ENCOUNTER — APPOINTMENT (EMERGENCY)
Dept: RADIOLOGY | Facility: HOSPITAL | Age: 62
End: 2022-11-21

## 2022-11-21 ENCOUNTER — APPOINTMENT (EMERGENCY)
Dept: CT IMAGING | Facility: HOSPITAL | Age: 62
End: 2022-11-21

## 2022-11-21 ENCOUNTER — HOSPITAL ENCOUNTER (EMERGENCY)
Facility: HOSPITAL | Age: 62
Discharge: HOME | End: 2022-11-21
Attending: EMERGENCY MEDICINE

## 2022-11-21 VITALS
RESPIRATION RATE: 16 BRPM | TEMPERATURE: 98.3 F | DIASTOLIC BLOOD PRESSURE: 66 MMHG | HEART RATE: 85 BPM | BODY MASS INDEX: 28.8 KG/M2 | OXYGEN SATURATION: 98 % | WEIGHT: 147.49 LBS | SYSTOLIC BLOOD PRESSURE: 129 MMHG

## 2022-11-21 DIAGNOSIS — R13.10 PAIN WITH SWALLOWING: Primary | ICD-10-CM

## 2022-11-21 DIAGNOSIS — R10.9 ABDOMINAL PAIN: ICD-10-CM

## 2022-11-21 LAB
2HR DELTA HS TROPONIN: >1 NG/L
ALBUMIN SERPL BCP-MCNC: 3.8 G/DL (ref 3.5–5)
ALP SERPL-CCNC: 149 U/L (ref 46–116)
ALT SERPL W P-5'-P-CCNC: 89 U/L (ref 12–78)
ANION GAP SERPL CALCULATED.3IONS-SCNC: 2 MMOL/L (ref 4–13)
BASOPHILS # BLD AUTO: 0.05 THOUSANDS/ÂΜL (ref 0–0.1)
BASOPHILS NFR BLD AUTO: 1 % (ref 0–1)
BILIRUB SERPL-MCNC: 0.36 MG/DL (ref 0.2–1)
BUN SERPL-MCNC: 11 MG/DL (ref 5–25)
CALCIUM SERPL-MCNC: 9.7 MG/DL (ref 8.3–10.1)
CARDIAC TROPONIN I PNL SERPL HS: 3 NG/L
CARDIAC TROPONIN I PNL SERPL HS: <2 NG/L
CHLORIDE SERPL-SCNC: 101 MMOL/L (ref 96–108)
CO2 SERPL-SCNC: 34 MMOL/L (ref 21–32)
CREAT SERPL-MCNC: 0.71 MG/DL (ref 0.6–1.3)
EOSINOPHIL # BLD AUTO: 0.21 THOUSAND/ÂΜL (ref 0–0.61)
EOSINOPHIL NFR BLD AUTO: 2 % (ref 0–6)
ERYTHROCYTE [DISTWIDTH] IN BLOOD BY AUTOMATED COUNT: 13.1 % (ref 11.6–15.1)
GFR SERPL CREATININE-BSD FRML MDRD: 92 ML/MIN/1.73SQ M
GLUCOSE SERPL-MCNC: 99 MG/DL (ref 65–140)
HCT VFR BLD AUTO: 41.9 % (ref 34.8–46.1)
HGB BLD-MCNC: 13.4 G/DL (ref 11.5–15.4)
IMM GRANULOCYTES # BLD AUTO: 0.02 THOUSAND/UL (ref 0–0.2)
IMM GRANULOCYTES NFR BLD AUTO: 0 % (ref 0–2)
LIPASE SERPL-CCNC: 94 U/L (ref 73–393)
LYMPHOCYTES # BLD AUTO: 2.99 THOUSANDS/ÂΜL (ref 0.6–4.47)
LYMPHOCYTES NFR BLD AUTO: 35 % (ref 14–44)
MCH RBC QN AUTO: 27.4 PG (ref 26.8–34.3)
MCHC RBC AUTO-ENTMCNC: 32 G/DL (ref 31.4–37.4)
MCV RBC AUTO: 86 FL (ref 82–98)
MONOCYTES # BLD AUTO: 0.49 THOUSAND/ÂΜL (ref 0.17–1.22)
MONOCYTES NFR BLD AUTO: 6 % (ref 4–12)
NEUTROPHILS # BLD AUTO: 4.86 THOUSANDS/ÂΜL (ref 1.85–7.62)
NEUTS SEG NFR BLD AUTO: 56 % (ref 43–75)
NRBC BLD AUTO-RTO: 0 /100 WBCS
PLATELET # BLD AUTO: 312 THOUSANDS/UL (ref 149–390)
PMV BLD AUTO: 10 FL (ref 8.9–12.7)
POTASSIUM SERPL-SCNC: 3.6 MMOL/L (ref 3.5–5.3)
PROT SERPL-MCNC: 7.6 G/DL (ref 6.4–8.4)
RBC # BLD AUTO: 4.89 MILLION/UL (ref 3.81–5.12)
S PYO DNA THROAT QL NAA+PROBE: NOT DETECTED
SODIUM SERPL-SCNC: 137 MMOL/L (ref 135–147)
WBC # BLD AUTO: 8.62 THOUSAND/UL (ref 4.31–10.16)

## 2022-11-21 RX ADMIN — IOHEXOL 100 ML: 350 INJECTION, SOLUTION INTRAVENOUS at 14:00

## 2022-11-21 NOTE — ED PROVIDER NOTES
History  Chief Complaint   Patient presents with   • Sore Throat     Pt reports sore throat, neck swelling  States its difficult to swallow  Subjective fever at home  Also reports chest tightness  Mary Elliott is a 65 y o  female w/ a PMHx of diabetes mellitus, hyperlipidemia, and hypertension presenting to the ED c/o difficulty swallowing which began 2 days ago  Pt reports that difficulty swallowing began at rest and she described it as a feeling of pressure in her esophagus after she swallows  She reports associated cough, chest pain, and LUQ pain which also began 2 days ago  She reports that she has had issues with dysphagia in the past and follows with GI however this time the "pressure" in her esophagus feels higher up  Pt has EGD scheduled for next week  Pt reports that she also follows with ENT for same  She currently denies any sore throat, throat swelling, voice change, difficulty breathing, vomiting, lower abdominal pain, urinary symptoms, shortness of breath, pain with inspiration, or weakness  Prior to Admission Medications   Prescriptions Last Dose Informant Patient Reported? Taking? Escitalopram Oxalate (LEXAPRO PO)   Yes No   Sig: Take by mouth   albuterol (PROVENTIL HFA,VENTOLIN HFA) 90 mcg/act inhaler   Yes No   Sig: Inhale 2 puffs every 4 (four) hours   atorvastatin (LIPITOR) 40 mg tablet   Yes No   Sig: Take 40 mg by mouth every evening   Patient not taking: Reported on 5/24/2022   dicyclomine (BENTYL) 20 mg tablet   No No   Sig: Take 1 tablet (20 mg total) by mouth in the morning and 1 tablet (20 mg total) in the evening     metFORMIN (GLUCOPHAGE) 500 mg tablet   Yes No   Sig: Take 500 mg by mouth daily   omeprazole (PriLOSEC) 20 mg delayed release capsule   Yes No   Sig: Take 20 mg by mouth daily   triamterene-hydrochlorothiazide (DYAZIDE) 37 5-25 mg per capsule   Yes No   Sig: Take 1 capsule by mouth daily      Facility-Administered Medications: None       Past Medical History:   Diagnosis Date   • Diabetes mellitus ([facility] [address] )    • Diverticulosis    • HLD (hyperlipidemia)    • Hypertension        Past Surgical History:   Procedure Laterality Date   • APPENDECTOMY     • CHOLECYSTECTOMY     • HYSTERECTOMY  2001   • LIVER SURGERY     • OOPHORECTOMY Left 2001   • REPLACEMENT TOTAL KNEE         Family History   Problem Relation Age of Onset   • Prostate cancer Father 67   • Prostate cancer Paternal Grandfather    • Breast cancer Paternal Aunt    • Prostate cancer Paternal Uncle    • Prostate cancer Paternal Uncle    • Cancer Paternal Aunt         blood cancer     I have reviewed and agree with the history as documented  E-Cigarette/Vaping   • E-Cigarette Use Never User      E-Cigarette/Vaping Substances     Social History     Tobacco Use   • Smoking status: Never   • Smokeless tobacco: Never   Vaping Use   • Vaping Use: Never used   Substance Use Topics   • Alcohol use: Not Currently   • Drug use: Not Currently       Review of Systems   Constitutional: Negative for activity change, appetite change, fatigue, fever and unexpected weight change  HENT: Positive for trouble swallowing  Negative for congestion, dental problem, ear pain, facial swelling, sinus pressure, sore throat and voice change  Respiratory: Positive for cough  Negative for choking, chest tightness, shortness of breath, wheezing and stridor  Cardiovascular: Positive for chest pain  Negative for palpitations and leg swelling  Gastrointestinal: Positive for abdominal pain  Negative for constipation, nausea and vomiting  Genitourinary: Negative for decreased urine volume and dysuria  Musculoskeletal: Negative for neck pain and neck stiffness  Skin: Negative for rash  Neurological: Negative for dizziness, facial asymmetry, light-headedness and headaches  Psychiatric/Behavioral: Negative for agitation  The patient is not nervous/anxious  Physical Exam  Physical Exam  Vitals and nursing note reviewed  Constitutional:       General: She is not in acute distress  Appearance: She is well-developed  She is not ill-appearing, toxic-appearing or diaphoretic  HENT:      Head: Normocephalic and atraumatic  Nose: No congestion  Mouth/Throat:      Mouth: Mucous membranes are moist       Dentition: No gingival swelling  Tongue: No lesions  Tongue does not deviate from midline  Palate: No mass and lesions  Pharynx: No pharyngeal swelling, oropharyngeal exudate, posterior oropharyngeal erythema or uvula swelling  Tonsils: No tonsillar exudate or tonsillar abscesses  Eyes:      Conjunctiva/sclera: Conjunctivae normal       Pupils: Pupils are equal, round, and reactive to light  Neck:      Trachea: Trachea normal  No tracheal tenderness or tracheal deviation  Cardiovascular:      Rate and Rhythm: Normal rate and regular rhythm  Heart sounds: No murmur heard  Pulmonary:      Effort: Pulmonary effort is normal  No accessory muscle usage, respiratory distress or retractions  Breath sounds: Normal breath sounds  No stridor, decreased air movement or transmitted upper airway sounds  No decreased breath sounds, wheezing, rhonchi or rales  Chest:      Chest wall: No crepitus  Abdominal:      General: There is no distension  There are no signs of injury  Palpations: Abdomen is soft  Tenderness: There is abdominal tenderness in the left upper quadrant  There is no guarding  Negative signs include Irwin's sign and McBurney's sign  Musculoskeletal:         General: No swelling  Cervical back: Full passive range of motion without pain, normal range of motion and neck supple  No edema, erythema, signs of trauma, rigidity or crepitus  No pain with movement  Normal range of motion  Right lower leg: No edema  Left lower leg: No edema  Lymphadenopathy:      Cervical: No cervical adenopathy  Skin:     General: Skin is warm and dry        Capillary Refill: Capillary refill takes less than 2 seconds  Comments: Area of skin hyperpigmentation to the right of thoracic spine consistent with acanthosis nigricans   Neurological:      Mental Status: She is alert     Psychiatric:         Mood and Affect: Mood normal              Vital Signs  ED Triage Vitals   Temperature Pulse Respirations Blood Pressure SpO2   11/21/22 1122 11/21/22 1122 11/21/22 1122 11/21/22 1126 11/21/22 1122   98 3 °F (36 8 °C) 88 18 142/90 99 %      Temp Source Heart Rate Source Patient Position - Orthostatic VS BP Location FiO2 (%)   11/21/22 1122 11/21/22 1122 11/21/22 1122 11/21/22 1122 --   Oral Monitor Sitting Right arm       Pain Score       --                  Vitals:    11/21/22 1122 11/21/22 1126 11/21/22 1347   BP:  142/90 129/66   Pulse: 88  85   Patient Position - Orthostatic VS: Sitting  Lying         Visual Acuity      ED Medications  Medications   iohexol (OMNIPAQUE) 350 MG/ML injection (SINGLE-DOSE) 100 mL (100 mL Intravenous Given 11/21/22 1400)       Diagnostic Studies  Results Reviewed     Procedure Component Value Units Date/Time    HS Troponin I 2hr [[id number]]  (Normal) Collected: 11/21/22 1428    Lab Status: Final result Specimen: Blood from Arm, Left Updated: 11/21/22 1454     hs TnI 2hr 3 ng/L      Delta 2hr hsTnI >1 ng/L     Comprehensive metabolic panel [[id number]]  (Abnormal) Collected: 11/21/22 1226    Lab Status: Final result Specimen: Blood from Arm, Left Updated: 11/21/22 1325     Sodium 137 mmol/L      Potassium 3 6 mmol/L      Chloride 101 mmol/L      CO2 34 mmol/L      ANION GAP 2 mmol/L      BUN 11 mg/dL      Creatinine 0 71 mg/dL      Glucose 99 mg/dL      Calcium 9 7 mg/dL      AST --     ALT 89 U/L      Alkaline Phosphatase 149 U/L      Total Protein 7 6 g/dL      Albumin 3 8 g/dL      Total Bilirubin 0 36 mg/dL      eGFR 92 ml/min/1 73sq m     Narrative:      Ha guidelines for Chronic Kidney Disease (CKD):   •  Stage 1 with normal or high GFR (GFR > 90 mL/min/1 73 square meters)  •  Stage 2 Mild CKD (GFR = 60-89 mL/min/1 73 square meters)  •  Stage 3A Moderate CKD (GFR = 45-59 mL/min/1 73 square meters)  •  Stage 3B Moderate CKD (GFR = 30-44 mL/min/1 73 square meters)  •  Stage 4 Severe CKD (GFR = 15-29 mL/min/1 73 square meters)  •  Stage 5 End Stage CKD (GFR <15 mL/min/1 73 square meters)  Note: GFR calculation is accurate only with a steady state creatinine    Strep A PCR [[id number]]  (Normal) Collected: 11/21/22 1221    Lab Status: Final result Specimen: Throat Updated: 11/21/22 1301     STREP A PCR Not Detected    Lipase [[id number]]  (Normal) Collected: 11/21/22 1226    Lab Status: Final result Specimen: Blood from Arm, Left Updated: 11/21/22 1300     Lipase 94 u/L     HS Troponin 0hr (reflex protocol) [[id number]]  (Normal) Collected: 11/21/22 1226    Lab Status: Final result Specimen: Blood from Arm, Left Updated: 11/21/22 1257     hs TnI 0hr <2 ng/L     CBC and differential [[id number]] Collected: 11/21/22 1226    Lab Status: Final result Specimen: Blood from Arm, Left Updated: 11/21/22 1232     WBC 8 62 Thousand/uL      RBC 4 89 Million/uL      Hemoglobin 13 4 g/dL      Hematocrit 41 9 %      MCV 86 fL      MCH 27 4 pg      MCHC 32 0 g/dL      RDW 13 1 %      MPV 10 0 fL      Platelets 537 Thousands/uL      nRBC 0 /100 WBCs      Neutrophils Relative 56 %      Immat GRANS % 0 %      Lymphocytes Relative 35 %      Monocytes Relative 6 %      Eosinophils Relative 2 %      Basophils Relative 1 %      Neutrophils Absolute 4 86 Thousands/µL      Immature Grans Absolute 0 02 Thousand/uL      Lymphocytes Absolute 2 99 Thousands/µL      Monocytes Absolute 0 49 Thousand/µL      Eosinophils Absolute 0 21 Thousand/µL      Basophils Absolute 0 05 Thousands/µL     Mononucleosis screen [[id number]] Collected: 11/21/22 1226    Lab Status:  In process Specimen: Blood from Arm, Left Updated: 11/21/22 1230                 CT abdomen pelvis with contrast   Final Result by Mirian Marrero MD (11/21 1430)      No acute intra-abdominal pathology  Hepatic steatosis  Right-sided nephrolithiasis and extensive cortical renal scarring  Uncomplicated colonic diverticulosis  Workstation performed: [id number]         XR chest 1 view portable   Final Result by Richard Cardona MD (11/21 1233)      No acute cardiopulmonary disease  Findings are stable            Workstation performed: [id number]                    Procedures  Procedures         ED Course                               SBIRT 22yo+    Flowsheet Row Most Recent Value   SBIRT (25 yo +)    In order to provide better care to our patients, we are screening all of our patients for alcohol and drug use  Would it be okay to ask you these screening questions? No Filed at: 11/21/2022 1432                    MDM  Number of Diagnoses or Management Options  Abdominal pain: new and requires workup  Pain with swallowing: new and requires workup  Diagnosis management comments: Rich Jones is a 65 y o  female w/ a PMHx of diabetes mellitus, hyperlipidemia, and hypertension presenting to the ED c/o difficulty swallowing which began 2 days ago  On exam patient has mild reproducible chest wall tenderness over the sternum  Observed patient while she drank and entire cup of water without any difficulty  She is relaxed, smiling, laying in a reclined position in bed, and tolerating her secretions and swallowing without any signs of any difficulty  There is no oropharyngeal erythema or edema  Lungs are clear to auscultation  Abdominal exam reveals left upper quadrant tenderness with no guarding  No masses palpated in abdomen  There is area of skin hyperpigmentation to the right of thoracic spine consistent with acanthosis nigricans  CBC, CMP, and lipase within normal limits  Strep negative  Mono spot in process  Troponin at first hour was <2   EKG reveals normal sinus rhythm with no obvious ischemic changes when read by me  Ordered CT of abd/pelvis to evaluate LUQ pain which reveals no acute intra-abdominal pathology, hepatic steatosis, right-sided nephrolithiasis and extensive cortical renal scarring, and uncomplicated colonic diverticulosis  Extensively discussed all CT scan findings including incidental findings  Discussed with patient that although we were not able to determine the cause of her issue today, she does not appear to be in acute distress and does not have difficulty breathing or swallowing so her issue will be best evaluated outpatient GI an ENT  She confirms that she does have an EGD next week and an appointment with ENT next week  Advised close follow up with PCP and specialties to evaluate symptoms        Amount and/or Complexity of Data Reviewed  Clinical lab tests: ordered and reviewed  Tests in the radiology section of CPT®: ordered and reviewed    Patient Progress  Patient progress: stable      Disposition  Final diagnoses:   Pain with swallowing   Abdominal pain     Time reflects when diagnosis was documented in both MDM as applicable and the Disposition within this note     Time User Action Codes Description Comment    11/21/2022  2:46 PM Roney Brothers [R13 10] Pain with swallowing     11/21/2022  2:46 PM Roney Brothers [R10 9] Abdominal pain       ED Disposition     ED Disposition   Discharge    Condition   Stable    Date/Time   Mon Nov 21, 2022  2:46 PM    [address] discharge to home/self care                 Follow-up Information     Follow up With Specialties Details Why Contact Info Additional [address], [place] [facility] [address] [place] [address] Emergency Department Emergency Medicine  If symptoms worsen [place] [id number]  [address] [place] [address] Emergency Department, [address] , [place], [place], [address]          Discharge Medication List as of 11/21/2022  2:47 PM      CONTINUE these medications which have NOT CHANGED    Details   albuterol (PROVENTIL HFA,VENTOLIN HFA) 90 mcg/act inhaler Inhale 2 puffs every 4 (four) hours, Starting Thu 2/11/2021, Historical Med      atorvastatin (LIPITOR) 40 mg tablet Take 40 mg by mouth every evening, Starting Tue 12/29/2020, Historical Med      dicyclomine (BENTYL) 20 mg tablet Take 1 tablet (20 mg total) by mouth in the morning and 1 tablet (20 mg total) in the evening , Starting Tue 5/24/2022, Normal      Escitalopram Oxalate (LEXAPRO PO) Take by mouth, Historical Med      metFORMIN (GLUCOPHAGE) 500 mg tablet Take 500 mg by mouth daily, Historical Med      omeprazole (PriLOSEC) 20 mg delayed release capsule Take 20 mg by mouth daily, Starting Fri 4/23/2021, Historical Med      triamterene-hydrochlorothiazide (DYAZIDE) 37 5-25 mg per capsule Take 1 capsule by mouth daily, Starting Mon 2/23/2015, Historical Med             No discharge procedures on file      PDMP Review     None          ED Provider  Electronically Signed by           Marci Drew, PA-C  11/22/22 6906

## 2022-11-21 NOTE — Clinical Note
Matteo Elizondo was seen and treated in our emergency department on 11/21/2022  Diagnosis:     Lucio Mcclellan  may return to work on return date  She may return on this date: 11/22/2022         If you have any questions or concerns, please don't hesitate to call        Ford Durand PA-C    ______________________________           _______________          _______________  Hospital Representative                              Date                                Time

## 2022-11-23 LAB — HETEROPH AB SER QL: NEGATIVE

## 2023-04-27 ENCOUNTER — TELEPHONE (OUTPATIENT)
Dept: DERMATOLOGY | Facility: CLINIC | Age: 63
End: 2023-04-27

## 2023-12-09 ENCOUNTER — HOSPITAL ENCOUNTER (EMERGENCY)
Facility: HOSPITAL | Age: 63
Discharge: HOME | End: 2023-12-09
Attending: EMERGENCY MEDICINE
Payer: COMMERCIAL

## 2023-12-09 ENCOUNTER — APPOINTMENT (EMERGENCY)
Dept: CT IMAGING | Facility: HOSPITAL | Age: 63
End: 2023-12-09
Payer: COMMERCIAL

## 2023-12-09 ENCOUNTER — APPOINTMENT (EMERGENCY)
Dept: RADIOLOGY | Facility: HOSPITAL | Age: 63
End: 2023-12-09
Payer: COMMERCIAL

## 2023-12-09 VITALS
DIASTOLIC BLOOD PRESSURE: 69 MMHG | RESPIRATION RATE: 18 BRPM | TEMPERATURE: 98.1 F | HEART RATE: 82 BPM | SYSTOLIC BLOOD PRESSURE: 154 MMHG | OXYGEN SATURATION: 98 %

## 2023-12-09 DIAGNOSIS — S39.012A BACK STRAIN, INITIAL ENCOUNTER: ICD-10-CM

## 2023-12-09 DIAGNOSIS — S16.1XXA NECK STRAIN, INITIAL ENCOUNTER: Primary | ICD-10-CM
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
## 2023-12-09 DIAGNOSIS — S66.919A: ICD-10-CM

## 2023-12-09 PROCEDURE — 99284 EMERGENCY DEPT VISIT MOD MDM: CPT

## 2023-12-09 PROCEDURE — 99284 EMERGENCY DEPT VISIT MOD MDM: CPT | Performed by: EMERGENCY MEDICINE

## 2023-12-09 PROCEDURE — 73130 X-RAY EXAM OF HAND: CPT

## 2023-12-09 PROCEDURE — G1004 CDSM NDSC: HCPCS

## 2023-12-09 PROCEDURE — 72125 CT NECK SPINE W/O DYE: CPT

## 2023-12-09 PROCEDURE — 74176 CT ABD & PELVIS W/O CONTRAST: CPT

## 2023-12-09 PROCEDURE — 71250 CT THORAX DX C-: CPT

## 2023-12-09 RX ORDER — LIDOCAINE 50 MG/G
1 PATCH TOPICAL ONCE
Status: DISCONTINUED | OUTPATIENT
Start: 2023-12-09 | End: 2023-12-09 | Stop reason: HOSPADM

## 2023-12-09 RX ORDER — ACETAMINOPHEN 325 MG/1
975 TABLET ORAL ONCE
Status: COMPLETED | OUTPATIENT
Start: 2023-12-09 | End: 2023-12-09

## 2023-12-09 RX ORDER — NAPROXEN 250 MG/1
500 TABLET ORAL ONCE
Status: COMPLETED | OUTPATIENT
Start: 2023-12-09 | End: 2023-12-09

## 2023-12-09 RX ADMIN — ACETAMINOPHEN 325MG 975 MG: 325 TABLET ORAL at 14:03

## 2023-12-09 RX ADMIN — NAPROXEN 500 MG: 250 TABLET ORAL at 14:03

## 2023-12-09 RX ADMIN — LIDOCAINE 1 PATCH: 50 PATCH CUTANEOUS at 14:04
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
## 2023-12-09 NOTE — DISCHARGE INSTRUCTIONS
Tylenol 650-1000 mg every 4 hours as needed (maximum 3000 mg in 24 hours)  Ibuprofen 400-600 mg every 6 hours as needed (take with food)  Lidoderm patches- can buy over the counter, on for 12 hours/off for 12 hours    Return to ED if new/worsening symptoms/concerns

## 2024-06-12 ENCOUNTER — CONSULT (OUTPATIENT)
Dept: ENDOCRINOLOGY | Facility: CLINIC | Age: 64
End: 2024-06-12
Payer: COMMERCIAL

## 2024-06-12 ENCOUNTER — TELEPHONE (OUTPATIENT)
Age: 64
End: 2024-06-12

## 2024-06-12 VITALS
WEIGHT: 142.8 LBS | DIASTOLIC BLOOD PRESSURE: 78 MMHG | BODY MASS INDEX: 28.03 KG/M2 | HEIGHT: 60 IN | SYSTOLIC BLOOD PRESSURE: 130 MMHG

## 2024-06-12 DIAGNOSIS — I10 HYPERTENSION, UNSPECIFIED TYPE: ICD-10-CM

## 2024-06-12 DIAGNOSIS — E78.49 OTHER HYPERLIPIDEMIA: ICD-10-CM

## 2024-06-12 DIAGNOSIS — E01.0 THYROMEGALY: ICD-10-CM

## 2024-06-12 DIAGNOSIS — Z79.4 TYPE 2 DIABETES MELLITUS WITHOUT COMPLICATION, WITH LONG-TERM CURRENT USE OF INSULIN (HCC): Primary | ICD-10-CM

## 2024-06-12 DIAGNOSIS — E11.9 TYPE 2 DIABETES MELLITUS WITHOUT COMPLICATION, WITH LONG-TERM CURRENT USE OF INSULIN (HCC): Primary | ICD-10-CM

## 2024-06-12 PROCEDURE — 99204 OFFICE O/P NEW MOD 45 MIN: CPT | Performed by: INTERNAL MEDICINE

## 2024-06-12 RX ORDER — EVOLOCUMAB 140 MG/ML
INJECTION, SOLUTION SUBCUTANEOUS
COMMUNITY

## 2024-06-12 RX ORDER — (INSULIN DEGLUDEC AND LIRAGLUTIDE) 100; 3.6 [IU]/ML; MG/ML
10 INJECTION, SOLUTION SUBCUTANEOUS
Qty: 15 ML | Refills: 3 | Status: SHIPPED | OUTPATIENT
Start: 2024-06-12

## 2024-06-12 NOTE — PATIENT INSTRUCTIONS
- Please stop taking Levemir and start taking Xultophy 10 units daily at bedtime   - Check your blood sugars daily before breakfast and alternate between checking before lunch, before dinner, and bedtime daily. You can send this office that log sheet in 2 weeks for review.  - I will send a prescription for your FreeStyle Jerod and refer you to diabetes education to learn how to use this and for medical nutrition therapy. Please bring a 3-day food log to this visit.  - Please try to walk more daily  - Please have your lab work done before your next visit

## 2024-06-12 NOTE — PROGRESS NOTES
Nunu Hargrove  63 y.o.  Female MRN: [id number]  Encounter: [id number]     New Patient Consult Note      CC: Type 2 diabetes mellitus      Referring Provider  No referring provider defined for this encounter.       ASSESSMENT AND PLAN  Assessment:   Nunu Hargrove is a 63 y.o. female with type 2 diabetes mellitus with long-term insulin use, hypertension, hyperlipidemia, and thyromegaly noted on physical exam.     Plan:  Type 2 diabetes mellitus   - A1c: 7.3% (4/4/24)   - Urine alb/creat: 26.5 (4/18/23)  - Discontinue Levemir and initiate Xultophy 10 units daily at bedtime   - Patient did not prefer FreeStyle Jerod 3 as prescribed by her PCP due to cost. I will send a prescription for the FreeStyle Jerod 2 sensor and reader for her to use if they are more cost effective.   - In the meantime, she should continue checking her blood glucose at least once daily and in a staggered manner if her work allows and was advised to send this office a log sheet in 2 weeks' time for review and further adjustment of her Xultophy dose.  - She has been referred to diabetes education for both MNT and CGM training. She was advised to bring a 3-day food log to this visit.   - Briefly discussed ADA diet with patient and advised her to increase her walking as tolerated to at least 30 minutes 3 times weekly  - Recommend annual retinopathy screening  - Recommend regular self-foot exams at home. Recommend regularly moisturizing dry feet.   - Reviewed hypoglycemic symptoms and management   - RTC in 3 months with lab work for BMP, A1c, and urine alb/cr ratio     2.   Hyperlipidemia  - LDL: 139 (4/4/24)  - Taking and tolerating Repatha well  - Continue regular follow-up with PCP     3.   Hypertension  - Continue regular follow-up with PCP     4.   Thyromegaly  - Will obtain thyroid ultrasound and TSH to further assess      HISTORY OF PRESENT ILLNESS  HPI:  Nunu Hargrove is a 63 y.o. female with a past medical history significant for type 2  diabetes mellitus, hypertension, hyperlipidemia, GERD, rheumatoid arthritis, osteoarthritis, fibromyalgia, and anxiety who presents for initial evaluation of her type 2 diabetes mellitus.    She was previously prediabetic but states she was diagnosed officially with type 2 diabetes mellitus 4 months ago. Of note, her A1c has been in the diabetic range since . She has previously been treated with metformin and glimepiride which were discontinued due to worsening hyperglycemia and transaminitis. She was unable to tolerate Januvia in the past due to gastric upset and Farxiga due to dizziness. She has now been initiated on insulin by her PCP and currently takes her 's remaining supply of Levemir 10 units at bedtime. She increased this from 8 units on 24 due to persistent fasting hyperglycemia. She injects in her abdomen and rotates sites appropriately.     Recent hospitalizations: Denies  Recent steroid use: betamethasone 3 mg injection for trigger finger (3/6/24), betamethasone 6 mg injection for right elbow medial epicondylitis (3/19/24), Medrol dose pack courses  Gestational diabetes: Denies  DKA/HHS: Denies    Glycemic Control   A1c: 7.3% (24)   Self-Monitored Blood Glucose     Fingersticks: Once daily     Glucometer: Cannot recall     Ranges:       Fastins-160s over the past 1 week; previously was 120s-170s    Hypoglycemia: One episode few weeks ago during which she felt uneasy. Blood glucose in 60s, improved with snack. Sometimes experiences similar episodes at work. Denies previous hospitalizations or EMS calls.    Lifestyle    Diabetes education: No    Diet:        Light breakfast (10:30a): Boiled egg, cheese, apple       Lunch (12-12:30p): Chicken sandwich       Dinner (7-8p): Tacos with chicken        Snacks: Fruit         Drinks: Water    Physical activity: Housework; tries to walk at work     Modifications: None    Eye exam: [facility] , no retinopathy; usually sees   Ruben  Macrovascular disease: Denies   Statin: Discontinued due to transaminitis; now on Repatha every 2 weeks  ACE-I/ARB: N/A  Diabetic foot ulcers: Denies  Dental disease: Denies    History of thyroid disorders: Noted thyromegaly on physical exam  History of pancreatitis: Complication of surgery, but none otherwise    Family history is significant for type 2 diabetes mellitus in her father.  Tobacco: Denies  Alcohol: Wine occasionally  Illicit drugs: Denies  Lives with:   Occupation: Works in immigration      Review of Systems   Constitutional:  Negative for appetite change, chills, fever and unexpected weight change.   HENT:  Positive for trouble swallowing. Negative for ear pain, sore throat and voice change.    Eyes:  Positive for visual disturbance (blurry vision). Negative for pain.   Respiratory:  Negative for cough and shortness of breath.    Cardiovascular:  Negative for chest pain and palpitations.   Gastrointestinal:  Negative for abdominal pain, diarrhea, nausea and vomiting.   Endocrine: Positive for polyphagia. Negative for polydipsia and polyuria.   Genitourinary:  Negative for dysuria and hematuria.   Musculoskeletal:  Negative for arthralgias and back pain.   Skin:  Negative for color change and rash.        Hair loss, dry skin, brittle nails   Neurological:  Negative for dizziness, tremors, seizures, syncope, light-headedness and numbness.   All other systems reviewed and are negative.       Past Medical History:   Diagnosis Date    Diabetes mellitus (HCC)     Diverticulosis     HLD (hyperlipidemia)     Hypertension       Past Surgical History:   Procedure Laterality Date    APPENDECTOMY      CHOLECYSTECTOMY      HYSTERECTOMY  2001    LIVER SURGERY      OOPHORECTOMY Left 2001    REPLACEMENT TOTAL KNEE        Family History   Problem Relation Age of Onset    Prostate cancer Father 72    Prostate cancer Paternal Grandfather     Breast cancer Paternal Aunt     Prostate cancer Paternal Uncle      Prostate cancer Paternal Uncle     Cancer Paternal Aunt         blood cancer     Social History     Tobacco Use    Smoking status: Never    Smokeless tobacco: Never   Substance Use Topics    Alcohol use: Not Currently     No Known Allergies      Current Outpatient Medications:     albuterol (PROVENTIL HFA,VENTOLIN HFA) 90 mcg/act inhaler, Inhale 2 puffs every 4 (four) hours, Disp: , Rfl:     Escitalopram Oxalate (LEXAPRO PO), Take by mouth, Disp: , Rfl:     omeprazole (PriLOSEC) 20 mg delayed release capsule, Take 20 mg by mouth daily, Disp: , Rfl:     triamterene-hydrochlorothiazide (DYAZIDE) 37.5-25 mg per capsule, Take 1 capsule by mouth daily, Disp: , Rfl:     atorvastatin (LIPITOR) 40 mg tablet, Take 40 mg by mouth every evening (Patient not taking: Reported on 5/24/2022), Disp: , Rfl:     dicyclomine (BENTYL) 20 mg tablet, Take 1 tablet (20 mg total) by mouth in the morning and 1 tablet (20 mg total) in the evening. (Patient not taking: Reported on 12/9/2023), Disp: 20 tablet, Rfl: 0    metFORMIN (GLUCOPHAGE) 500 mg tablet, Take 500 mg by mouth daily, Disp: , Rfl:       OBJECTIVE  Visit Vitals  Ht 5' (1.524 m)   Wt 64.8 kg (142 lb 12.8 oz)   BMI 27.89 kg/m²   OB Status Hysterectomy   Smoking Status Never   BSA 1.62 m²     Wt Readings from Last 3 Encounters:   06/12/24 64.8 kg (142 lb 12.8 oz)   11/21/22 66.9 kg (147 lb 7.8 oz)   05/24/22 67 kg (147 lb 11.3 oz)       Physical Exam  Constitutional:       General: She is not in acute distress.     Appearance: Normal appearance.   HENT:      Head: Normocephalic and atraumatic.   Eyes:      Extraocular Movements: Extraocular movements intact.      Pupils: Pupils are equal, round, and reactive to light.   Cardiovascular:      Rate and Rhythm: Normal rate and regular rhythm.      Pulses: no weak pulses.           Dorsalis pedis pulses are 2+ on the right side and 2+ on the left side.        Posterior tibial pulses are 2+ on the right side and 2+ on the left side.    Pulmonary:      Effort: Pulmonary effort is normal.      Breath sounds: Normal breath sounds.   Abdominal:      General: Bowel sounds are normal. There is no distension.      Palpations: Abdomen is soft.      Tenderness: There is no abdominal tenderness.   Musculoskeletal:         General: No swelling.      Cervical back: Normal range of motion and neck supple.      Right lower leg: No edema.      Left lower leg: No edema.   Feet:      Right foot:      Skin integrity: Dry skin present. No ulcer, skin breakdown, erythema, warmth or callus.      Left foot:      Skin integrity: Dry skin present. No ulcer, skin breakdown, erythema, warmth or callus.   Skin:     General: Skin is warm and dry.   Neurological:      General: No focal deficit present.      Mental Status: She is alert and oriented to person, place, and time.   Psychiatric:         Mood and Affect: Mood normal.         Behavior: Behavior normal.         Diabetic Foot Exam  Patient's shoes and socks removed.    Right Foot/Ankle   Right Foot Inspection  Skin Exam: skin normal, skin intact and dry skin. No warmth, no callus, no erythema, no maceration, no abnormal color, no pre-ulcer, no ulcer and no callus.     Toe Exam: ROM and strength within normal limits.     Sensory   Vibration: intact  Proprioception: intact  Monofilament testing: intact    Vascular  Capillary refills: < 3 seconds  The right DP pulse is 2+. The right PT pulse is 2+.     Left Foot/Ankle  Left Foot Inspection  Skin Exam: skin normal, skin intact and dry skin. No warmth, no erythema, no maceration, normal color, no pre-ulcer, no ulcer and no callus.     Toe Exam: ROM and strength within normal limits.     Sensory   Vibration: intact  Proprioception: intact  Monofilament testing: intact    Vascular  Capillary refills: < 3 seconds  The left DP pulse is 2+. The left PT pulse is 2+.     Assign Risk Category  No deformity present  No loss of protective sensation  No weak pulses  Risk:  0      Labs:    Latest Reference Range & Units 04/18/23 07:22 04/04/24 07:24   Sodium 135 - 145 mmol/L 143 (E) 143 (E)   Potassium 3.5 - 5.2 mmol/L 4.3 (E) 3.8 (E)   Chloride 100 - 109 mmol/L 103 (E) 103 (E)   Carbon Dioxide 21 - 31 mmol/L 30 (E) 31 (E)   ANION GAP 3 - 11  10 (E) 9 (E)   BUN 7 - 25 mg/dL 14 (E) 15 (E)   Creatinine 0.40 - 1.10 mg/dL 0.68 (E) 0.65 (E)   GLUCOSE 65 - 99 mg/dL 135 (H) (E) 157 (H) (E)   Calcium 8.5 - 10.1 mg/dL 9.4 (E) 9.6 (E)   AST <41 U/L 88 (H) (E) 78 (H) (E)   ALT <56 U/L 111 (H) (E) 164 (H) (E)   ALK PHOS 35 - 120 U/L 99 (E) 101 (E)   Total Protein 6.3 - 8.3 g/dL 6.9 (E) 6.8 (E)   Albumin 3.5 - 5.7 g/dL 4.5 (E) 4.5 (E)   Total Bilirubin 0.2 - 1.0 mg/dL 0.5 (E) 0.5 (E)   GFR, Calculated >59  99 (E) 99 (E)   Hemoglobin A1C <5.7 % 6.9 (H) (E) 7.3 (H) (E)   eAG, EST AVG Glucose mg/dL 151 (E) 163 (E)   EXT Creatinine Urine 50.0 - 200.0 mg/dL 203.5 (H) (E)    Albumin Creat Ratio <30.0 mg/gm CREA 26.5 (E)    Albumin,U,Random <3.0 mg/dL 5.4 (H) (E)    (H): Data is abnormally high  (E): External lab result      Discussed with the patient and all questioned fully answered. She will call me if any problems arise.    Counseled patient on diagnostic results, prognosis, risk and benefit of treatment options, instruction for management, importance of treatment compliance, risk factor reduction, and impressions.

## 2024-06-13 NOTE — TELEPHONE ENCOUNTER
Patient called to follow up on the insulin denial. She would like to be notified as soon as we get a response from the doctor if an alternative will be ordered or what the next step is.

## 2024-06-17 DIAGNOSIS — Z79.4 TYPE 2 DIABETES MELLITUS WITHOUT COMPLICATION, WITH LONG-TERM CURRENT USE OF INSULIN (HCC): Primary | ICD-10-CM

## 2024-06-17 DIAGNOSIS — E11.9 TYPE 2 DIABETES MELLITUS WITHOUT COMPLICATION, WITH LONG-TERM CURRENT USE OF INSULIN (HCC): Primary | ICD-10-CM

## 2024-06-17 RX ORDER — INSULIN GLARGINE AND LIXISENATIDE 100; 33 U/ML; UG/ML
15 INJECTION, SOLUTION SUBCUTANEOUS DAILY
Qty: 15 ML | Refills: 3 | Status: SHIPPED | OUTPATIENT
Start: 2024-06-17

## 2024-06-17 NOTE — TELEPHONE ENCOUNTER
Please advise, patient called today lookr for an update. Do you want to order an alternative or complete the prior auth?

## 2024-06-18 ENCOUNTER — TELEPHONE (OUTPATIENT)
Age: 64
End: 2024-06-18

## 2024-06-18 NOTE — TELEPHONE ENCOUNTER
Brenda from [employer] [facility] called in stating that patient's Jerod 2 Sensors, Xultophy, and Soliqua all need Prior Authorizations.    I did inform her that the PA was sent for Soliqua as per this encounter.     Please look into the Jerod 2 Sensors and Xultophy.    [employer] [facility] Insurance Info from Card     Member ID: [id number]  Person code: [id number]  RxBIN: [id number]  Rx[facility]N: [facility]  RxGRP:

## 2024-06-21 NOTE — TELEPHONE ENCOUNTER
[employer] [facility] calling as they did not receive a prior auth. They are going to start the prior auth process and send the central fax questions that need to be answered in order to complete the  prior auth.

## 2024-06-25 NOTE — TELEPHONE ENCOUNTER
Patient called- she is getting notifications about this from her pharmacy. Is there any updates? They need clinical information from the provider in order to start this PA.

## 2024-06-26 NOTE — TELEPHONE ENCOUNTER
Patient calling that the insurance has not received all info.      Insurance is not covering her sensor.     She needs insulin approved first.     Insurance wants to know why pt cannot take metformin or other medication.     The need full medical report.     Patient would like a call with an update.     Pt having trouble getting her supplies.     Blood sugars are high,     Please call pcp for info for records.       Pcp# [phone] Dr. Mary.

## 2024-06-27 ENCOUNTER — TELEPHONE (OUTPATIENT)
Dept: ENDOCRINOLOGY | Facility: CLINIC | Age: 64
End: 2024-06-27

## 2024-06-27 NOTE — TELEPHONE ENCOUNTER
PA for FreeStyle Jerod 2 Sensor    Submitted via    [x]CMM-KEY [id number] - PA Case ID: [id number]  []Surescripts-Case ID #   []Faxed to plan   []Other website   []Phone call Case ID #     Office notes sent, clinical questions answered. Awaiting determination    Turnaround time for your insurance to make a decision on your Prior Authorization can take 7-21 business days.

## 2024-06-27 NOTE — TELEPHONE ENCOUNTER
PA for SOLIQUA    Submitted via    []CMM-KEY   []Surescripts-Case ID #   [x]Faxed to [facility]  Case ID: [id number]  []Other website   []Phone call Case ID #     Office notes sent, clinical questions answered. Awaiting determination    Turnaround time for your insurance to make a decision on your Prior Authorization can take 7-21 business days.

## 2024-06-27 NOTE — TELEPHONE ENCOUNTER
Received p/a request for info   Faxed completed forms to [phone]    -------------------------------  Received return fax ok

## 2024-06-28 NOTE — TELEPHONE ENCOUNTER
PA for FreeStyle Jerod 2 Sensor Approved     Date(s) approved : 6/27/2024 to 6/27/2025    Case # [id number]    Patient advised by          [x] A.C. Moorehart Message  [] Phone call   []LMOM  []L/M to call office as no active Communication consent on file  []Unable to leave detailed message as VM not approved on Communication consent       Pharmacy advised by    [x]Fax  []Phone call    Approval letter scanned into Media No  not yet received

## 2024-06-28 NOTE — TELEPHONE ENCOUNTER
PA for Soliqua Approved     Date(s) approved through 6/21/2025    Case #    Patient advised by          [x] SpaceILhart Message  [] Phone call   []LMOM  []L/M to call office as no active Communication consent on file  []Unable to leave detailed message as VM not approved on Communication consent       Pharmacy advised by    [x]Fax  []Phone call    Approval letter scanned into Media Yes

## 2024-07-18 ENCOUNTER — NURSE TRIAGE (OUTPATIENT)
Age: 64
End: 2024-07-18

## 2024-07-18 ENCOUNTER — TELEPHONE (OUTPATIENT)
Age: 64
End: 2024-07-18

## 2024-07-18 NOTE — TELEPHONE ENCOUNTER
"Patient started taking Soliqua insulin, and she would take it at night, but then she switched to taking it in the morning like the directions stated and now her sugars are fluctuating. She said 7/11 she made the change to take it in morning and has been having issues ever since. This is the only insulin that she takes. She said her sugars are low in the morning, and she takes her insulin, and her sugar jumps to the 200's. She gets to work at 8am and her sensor beeps and her sugar is down to 60's. Patient would like a call back today to discuss.     Reason for Disposition   Blood glucose > 400 mg/dL (22.2 mmol/L)    Answer Assessment - Initial Assessment Questions  1. BLOOD GLUCOSE: \"What is your blood glucose level?\"       224  2. ONSET: \"When did you check the blood glucose?\"      currently  3. USUAL RANGE: \"What is your glucose level usually?\" (e.g., usual fasting morning value, usual evening value)      unsure  4. KETONES: \"Do you check for ketones (urine or blood test strips)?\" If yes, ask: \"What does the test show now?\"       unsure  5. TYPE 1 or 2:  \"Do you know what type of diabetes you have?\"  (e.g., Type 1, Type 2, Gestational; doesn't know)       unsure  6. INSULIN: \"Do you take insulin?\" \"What type of insulin(s) do you use? What is the mode of delivery? (syringe, pen; injection or pump)?\"       yes  7. DIABETES PILLS: \"Do you take any pills for your diabetes?\" If yes, ask: \"Have you missed taking any pills recently?\"      denies  8. OTHER SYMPTOMS: \"Do you have any symptoms?\" (e.g., fever, frequent urination, difficulty breathing, dizziness, weakness, vomiting)      Dizzy    Protocols used: Diabetes - High Blood Sugar-ADULT-[place]    "

## 2024-07-26 NOTE — TELEPHONE ENCOUNTER
Patient states she did not receive a call back regarding fluctuating BS. Please advise, thank you.

## 2024-07-26 NOTE — TELEPHONE ENCOUNTER
Spoke with patient and walked her through sharing with the office. There is only three days worth of data in the report I downloaded into the chart. Patient says she transitioned from the meter to the fidencio aracelis on July 23rd, she is okay with coming into the office for a cgm download if more data is needed for adjustments.

## 2024-08-22 ENCOUNTER — TELEPHONE (OUTPATIENT)
Age: 64
End: 2024-08-22

## 2024-08-22 NOTE — TELEPHONE ENCOUNTER
"Patient states she has been taking husbands Ozempic since 8-7. States it is 0.25 mg. States  can't take it because he just had surgery. States she took it because her BS \"was all over the place\". States since starting the Ozempic BS is more stable.   Patient states insurance denied Xultophy and she is no longer taking metformin due to liver issues. Medication list needs to be updated.   Asking if provider can review Jerod. Please advise, thank you.   "

## 2024-09-13 LAB
ALBUMIN SERPL-MCNC: 4.5 G/DL (ref 3.5–5.7)
ALBUMIN/CREAT UR: 19.1
ALP SERPL-CCNC: 118 U/L (ref 35–120)
ALT SERPL-CCNC: 166 U/L
ANION GAP SERPL CALCULATED.3IONS-SCNC: 10 MMOL/L (ref 3–11)
AST SERPL-CCNC: 119 U/L
BILIRUB SERPL-MCNC: 0.6 MG/DL (ref 0.2–1)
BUN SERPL-MCNC: 13 MG/DL (ref 7–25)
CALCIUM SERPL-MCNC: 10 MG/DL (ref 8.5–10.1)
CHLORIDE SERPL-SCNC: 101 MMOL/L (ref 100–109)
CO2 SERPL-SCNC: 31 MMOL/L (ref 21–31)
CREAT SERPL-MCNC: 0.62 MG/DL (ref 0.4–1.1)
CREAT UR-MCNC: 230.3 MG/DL (ref 50–200)
CYTOLOGY CMNT CVX/VAG CYTO-IMP: ABNORMAL
EST. AVERAGE GLUCOSE BLD GHB EST-MCNC: 148 MG/DL
GFR/BSA.PRED SERPLBLD CYS-BASED-ARV: 100 ML/MIN/{1.73_M2}
GLUCOSE SERPL-MCNC: 110 MG/DL (ref 65–99)
HBA1C MFR BLD: 6.8 %
MICROALBUMIN UR-MCNC: 4.4 MG/DL
POTASSIUM SERPL-SCNC: 3.8 MMOL/L (ref 3.5–5.2)
PROT SERPL-MCNC: 7 G/DL (ref 6.3–8.3)
SODIUM SERPL-SCNC: 142 MMOL/L (ref 135–145)
TSH SERPL-ACNC: 1.91 UIU/ML (ref 0.45–5.33)

## 2024-09-17 ENCOUNTER — OFFICE VISIT (OUTPATIENT)
Dept: ENDOCRINOLOGY | Facility: CLINIC | Age: 64
End: 2024-09-17
Payer: COMMERCIAL

## 2024-09-17 VITALS
HEART RATE: 68 BPM | BODY MASS INDEX: 28.07 KG/M2 | SYSTOLIC BLOOD PRESSURE: 120 MMHG | HEIGHT: 60 IN | DIASTOLIC BLOOD PRESSURE: 80 MMHG | OXYGEN SATURATION: 98 % | WEIGHT: 143 LBS

## 2024-09-17 DIAGNOSIS — E11.9 TYPE 2 DIABETES MELLITUS WITHOUT COMPLICATION, UNSPECIFIED WHETHER LONG TERM INSULIN USE (HCC): Primary | ICD-10-CM

## 2024-09-17 DIAGNOSIS — E78.5 HYPERLIPIDEMIA, UNSPECIFIED HYPERLIPIDEMIA TYPE: ICD-10-CM

## 2024-09-17 DIAGNOSIS — I10 ESSENTIAL (PRIMARY) HYPERTENSION: ICD-10-CM

## 2024-09-17 DIAGNOSIS — K58.1 IRRITABLE BOWEL SYNDROME WITH CONSTIPATION: ICD-10-CM

## 2024-09-17 PROBLEM — K58.9 IBS (IRRITABLE BOWEL SYNDROME): Status: ACTIVE | Noted: 2024-09-17

## 2024-09-17 PROBLEM — K21.00 GASTRO-ESOPHAGEAL REFLUX DISEASE WITH ESOPHAGITIS: Status: ACTIVE | Noted: 2024-09-17

## 2024-09-17 PROCEDURE — 95251 CONT GLUC MNTR ANALYSIS I&R: CPT | Performed by: PHYSICIAN ASSISTANT

## 2024-09-17 PROCEDURE — 99214 OFFICE O/P EST MOD 30 MIN: CPT

## 2024-09-17 NOTE — PATIENT INSTRUCTIONS
Stop taking Soliqua  Begin taking Ozempic 0.5mg weekly  Update labs in 3 months  Follow up in 3-4 months

## 2024-09-17 NOTE — ASSESSMENT & PLAN NOTE
Does experience some constipation at baseline  Discussed possible side effects of Ozempic, patient to monitor  Continue outpatient follow-up with GI

## 2024-09-17 NOTE — PROGRESS NOTES
Established Patient Progress Note      Chief Complaint   Patient presents with    Diabetes Type 2        Impression & Plan:    Problem List Items Addressed This Visit       Essential (primary) hypertension     BP at goal.   Continue current medication regimen of Dyazide           Hyperlipidemia     Continue Repatha  Check routine lipid panel           Type 2 diabetes mellitus without complications (HCC) - Primary       Lab Results   Component Value Date    HGBA1C 6.8 (H) 09/13/2024    HGBA1C 6.8 (H) 09/13/2024     HGA1C: 6.8  Treatment regimen:   Soliqua 15 daily  Ozempic 0.25 weekly (has been taking 's)  Recommend discontinuation of Soliqua and prescribed Ozempic 0.5 weekly  Discussed risks/complications associated with uncontrolled diabetes.    Advised to adhere to diabetic diet, and recommended staying active/exercising routinely.  Keep carbohydrates consistent to limit blood glucose fluctuations.  Advised to call if blood sugars less than 70 mg/dl or over 300 mg/dl.   Discussed symptoms and treatment of hypoglycemia.    Recommended routine follow-up with Podiatry and Ophthalmology.   Ordered blood work to complete prior to next visit.  Continue CGM           Relevant Medications    semaglutide, 0.25 or 0.5 mg/dose, (Ozempic, 0.25 or 0.5 MG/DOSE,) 2 mg/1.5 mL injection pen    Other Relevant Orders    Comprehensive metabolic panel    Hemoglobin A1C    IBS (irritable bowel syndrome)     Does experience some constipation at baseline  Discussed possible side effects of Ozempic, patient to monitor  Continue outpatient follow-up with GI            History of Present Illness:   Nunu Hargrove is a 63 y.o. female with type 2 diabetes seen in follow up. Reports complications of none. Denies recent severe hypoglycemic or severe hyperglycemic episodes. Denies any issues with her current regimen. Last A1C was 6.8. Home glucose monitoring: are performed regularly with CGM     CGM Interpretation:  Nunu Hargrove    Date Range: 9/4/2024 - 9/17/2024  Device used: Jerod 2  Home use   Indication: Type 2 Diabetes   Analysis of data:   Average Glucose: 122  Coefficient of Variation: 22.8%   Time in Target Range: 96%   Time Above Range: 4%   Time Below Range: 0%   Interpretation of data: Blood sugar very well-controlled.  No episodes of hypoglycemia noted, very rare hypoglycemia  More than 72 hours of data was reviewed. Report to be scanned to chart.       Current regimen:   Soliqua 15 units daily   Ozempic 0.25mg q7 days (taking 's x1 month)      Last Eye Exam: UTD, has upcoming appointment  Last Foot Exam: June 2024    Has hypertension: Taking Dyazide   Has hyperlipidemia: Taking Repatha       Patient Active Problem List   Diagnosis    Essential (primary) hypertension    Gastro-esophageal reflux disease with esophagitis    Hyperlipidemia    Type 2 diabetes mellitus without complications (HCC)    IBS (irritable bowel syndrome)      Past Medical History:   Diagnosis Date    Diabetes mellitus (HCC)     Diverticulosis     HLD (hyperlipidemia)     Hypertension       Past Surgical History:   Procedure Laterality Date    APPENDECTOMY      CHOLECYSTECTOMY      HYSTERECTOMY  2001    LIVER SURGERY      OOPHORECTOMY Left 2001    REPLACEMENT TOTAL KNEE        Social History     Tobacco Use    Smoking status: Never    Smokeless tobacco: Never   Substance Use Topics    Alcohol use: Not Currently     No Known Allergies      Current Outpatient Medications:     albuterol (PROVENTIL HFA,VENTOLIN HFA) 90 mcg/act inhaler, Inhale 2 puffs every 4 (four) hours, Disp: , Rfl:     Continuous Glucose  (FreeStyle Jerod 2 Campbell) SILVIA, Use 1 each in the morning, Disp: 1 each, Rfl: 0    Continuous Glucose Sensor (FreeStyle Jerod 2 Sensor) MISC, Use 1 each every 14 (fourteen) days, Disp: 2 each, Rfl: 5    Escitalopram Oxalate (LEXAPRO PO), Take 20 mg by mouth Once a day, Disp: , Rfl:     Evolocumab (Repatha) 140 MG/ML SOSY, Inject under the skin  Once every two weeks, Disp: , Rfl:     omeprazole (PriLOSEC) 20 mg delayed release capsule, Take 20 mg by mouth daily, Disp: , Rfl:     semaglutide, 0.25 or 0.5 mg/dose, (Ozempic, 0.25 or 0.5 MG/DOSE,) 2 mg/1.5 mL injection pen, Inject 0.5 mg under the skin every 7 days, Disp: , Rfl:     triamterene-hydrochlorothiazide (DYAZIDE) 37.5-25 mg per capsule, Take 1 capsule by mouth daily, Disp: , Rfl:     Review of Systems   Constitutional:  Positive for appetite change.   All other systems reviewed and are negative.      Physical Exam:  Body mass index is 27.93 kg/m².  /80   Pulse 68   Ht 5' (1.524 m)   Wt 64.9 kg (143 lb)   SpO2 98%   BMI 27.93 kg/m²    Wt Readings from Last 3 Encounters:   09/17/24 64.9 kg (143 lb)   06/12/24 64.8 kg (142 lb 12.8 oz)   11/21/22 66.9 kg (147 lb 7.8 oz)       Physical Exam  Constitutional:       General: She is not in acute distress.     Appearance: She is well-developed.   HENT:      Head: Normocephalic and atraumatic.   Eyes:      General: No scleral icterus.     Conjunctiva/sclera: Conjunctivae normal.   Cardiovascular:      Rate and Rhythm: Normal rate.   Pulmonary:      Effort: Pulmonary effort is normal. No respiratory distress.   Skin:     Findings: No rash.   Neurological:      Mental Status: She is alert.           Labs:   Lab Results   Component Value Date    HGBA1C 6.8 (H) 09/13/2024    HGBA1C 6.8 (H) 09/13/2024    HGBA1C 7.3 (H) 04/04/2024     Lab Results   Component Value Date    CREATININE 0.62 09/13/2024    CREATININE 0.62 09/13/2024    CREATININE 0.56 05/30/2024    BUN 13 09/13/2024    BUN 13 09/13/2024     10/07/2015    K 3.8 09/13/2024    K 3.8 09/13/2024     09/13/2024     09/13/2024    CO2 31 09/13/2024    CO2 31 09/13/2024     GFR, Calculated   Date Value Ref Range Status   07/13/2020 100 >60 mL/min/1.73m2 Final     Comment:     mL/min per 1.73 square meters                                            Normal Function or Mild Renal     "Disease (if clinically at risk):  >or=60  Moderately Decreased:                30-59  Severely Decreased:                  15-29  Renal Failure:                         <15                                            -American GFR: multiply reported GFR by 1.16    Please note that the eGFR is based on the CKD-EPI calculation, and is not intended to be used for drug dosing.                                            Note: Calculated GFR may not be an accurate indicator of renal function if the patient's renal function is not in a steady state.     eGFRcr   Date Value Ref Range Status   09/13/2024 100 >59 Final     eGFR   Date Value Ref Range Status   09/13/2024 100 >59 Final     Lab Results   Component Value Date    HDL 49 10/26/2019    TRIG 142 10/26/2019     Lab Results   Component Value Date     (H) 09/13/2024     (H) 09/13/2024     (H) 09/13/2024     (H) 09/13/2024    ALKPHOS 118 09/13/2024    ALKPHOS 118 09/13/2024    BILITOT 0.28 10/07/2015     No results found for: \"[id number]\"    Patient Instructions   Stop taking Soliqua  Begin taking Ozempic 0.5mg weekly  Update labs in 3 months  Follow up in 3-4 months    Discussed with the patient and all questioned fully answered. She will call me if any problems arise.    JAMI Oliver  "

## 2024-09-17 NOTE — ASSESSMENT & PLAN NOTE
Lab Results   Component Value Date    HGBA1C 6.8 (H) 09/13/2024    HGBA1C 6.8 (H) 09/13/2024     HGA1C: 6.8  Treatment regimen:   Soliqua 15 daily  Ozempic 0.25 weekly (has been taking 's)  Recommend discontinuation of Soliqua and prescribed Ozempic 0.5 weekly  Discussed risks/complications associated with uncontrolled diabetes.    Advised to adhere to diabetic diet, and recommended staying active/exercising routinely.  Keep carbohydrates consistent to limit blood glucose fluctuations.  Advised to call if blood sugars less than 70 mg/dl or over 300 mg/dl.   Discussed symptoms and treatment of hypoglycemia.    Recommended routine follow-up with Podiatry and Ophthalmology.   Ordered blood work to complete prior to next visit.  Continue CGM

## 2024-10-03 DIAGNOSIS — Z79.4 TYPE 2 DIABETES MELLITUS WITHOUT COMPLICATION, WITH LONG-TERM CURRENT USE OF INSULIN (HCC): ICD-10-CM

## 2024-10-03 DIAGNOSIS — E11.9 TYPE 2 DIABETES MELLITUS WITHOUT COMPLICATION, WITH LONG-TERM CURRENT USE OF INSULIN (HCC): ICD-10-CM

## 2024-10-03 NOTE — TELEPHONE ENCOUNTER
Pt requesting 90 day refills on these :    Ozempic    Freestyle fidencio 2 sensors      Send to [facility] pharmacy in [place]

## 2024-10-04 ENCOUNTER — TELEPHONE (OUTPATIENT)
Age: 64
End: 2024-10-04

## 2024-10-04 DIAGNOSIS — E11.9 TYPE 2 DIABETES MELLITUS WITHOUT COMPLICATION, WITH LONG-TERM CURRENT USE OF INSULIN (HCC): Primary | ICD-10-CM

## 2024-10-04 DIAGNOSIS — Z79.4 TYPE 2 DIABETES MELLITUS WITHOUT COMPLICATION, WITH LONG-TERM CURRENT USE OF INSULIN (HCC): Primary | ICD-10-CM

## 2024-10-04 DIAGNOSIS — E11.9 TYPE 2 DIABETES MELLITUS WITHOUT COMPLICATION, WITH LONG-TERM CURRENT USE OF INSULIN (HCC): ICD-10-CM

## 2024-10-04 DIAGNOSIS — Z79.4 TYPE 2 DIABETES MELLITUS WITHOUT COMPLICATION, WITH LONG-TERM CURRENT USE OF INSULIN (HCC): ICD-10-CM

## 2024-10-04 NOTE — TELEPHONE ENCOUNTER
THIS IS NOT A DUPLICATE PATIENT REQUESTING NEW PHARMACY    Freestyle Jerod 2 sensors    Ozempic 2mg/1.5mL injection pen      **Please send to [facility]   Store # [id number] located at [address] [place] [address] [address] in [place] [address]**

## 2024-10-04 NOTE — TELEPHONE ENCOUNTER
Patient asking if her provider sent over her prescriptions because she just got off the phone with the pharmacy and they told her that they did not have anything for her.      I did confirm the two scripts were sent over at around 10am so just give them time to go into the system and then if later on the pharmacy still does not have them, please give us a call.

## 2024-10-04 NOTE — TELEPHONE ENCOUNTER
Pharmacy called the RX Refill Line. Message is being forwarded to the office.     Pharmacy called regarding semaglutide. States that it needs to be resent for Ozempic 2 mg/3 ml. Directions would be 0.5 ml and needs it be be specified in milligrams as well per pharmicist.    Please contact pharmacy at [phone] if any issues.

## 2024-10-07 ENCOUNTER — TELEPHONE (OUTPATIENT)
Dept: ENDOCRINOLOGY | Facility: CLINIC | Age: 64
End: 2024-10-07

## 2024-10-07 NOTE — TELEPHONE ENCOUNTER
Phone call from Trena at [facility] -  Ozempic requires Authorization and can be done by calling [phone] 24hrs a day 7 days a week.

## 2024-10-08 NOTE — TELEPHONE ENCOUNTER
Patient's [facility] located in [place] called and wanted to know if the Ozempic dose was changed and resent in. I let them know that it was, but it was sent to [facility].     I am unsure if the patient wanted mail order or not. Please contact patient and see if they want the mail order or the local one at [place] and please send to the [place] one if that's what the patient wants.    21-Dec-2022 12:22 21-Dec-2022 20:22

## 2024-10-09 NOTE — TELEPHONE ENCOUNTER
Patient calling to ask why the ozempic PA was submitted for 2mg when her dose is 0.5mg. I informed her that the 2mg is what is available in the pen. She will be taking the 0.5mg dose. She expressed understanding.  No further action needed

## 2024-10-09 NOTE — TELEPHONE ENCOUNTER
PA for Ozempic 2mg SUBMITTED     via    [x]CMM-KEY: [id number]  []Surescripts-Case ID #   []Availity-Auth ID # NDC #   []Faxed to plan   []Other website   []Phone call Case ID #     Office notes sent, clinical questions answered. Awaiting determination    Turnaround time for your insurance to make a decision on your Prior Authorization can take 7-21 business days.

## 2024-10-10 NOTE — TELEPHONE ENCOUNTER
PA for Ozempic 2mg APPROVED           Patient advised by          []MyChart Message  []Phone call   [x]LMOM  []L/M to call office as no active Communication consent on file  []Unable to leave detailed message as VM not approved on Communication consent       Pharmacy advised by    [x]Fax  []Phone call    Approval letter scanned into Media No

## 2025-01-09 ENCOUNTER — RESULTS FOLLOW-UP (OUTPATIENT)
Dept: ENDOCRINOLOGY | Facility: CLINIC | Age: 65
End: 2025-01-09

## 2025-01-09 LAB
ALBUMIN/CREAT UR: 16 MG/G CREAT
BUN SERPL-MCNC: 15 MG/DL (ref 7–25)
BUN/CREAT SERPL: ABNORMAL (CALC) (ref 6–22)
CALCIUM SERPL-MCNC: 9.7 MG/DL (ref 8.6–10.4)
CHLORIDE SERPL-SCNC: 104 MMOL/L (ref 98–110)
CO2 SERPL-SCNC: 31 MMOL/L (ref 20–32)
CREAT SERPL-MCNC: 0.63 MG/DL (ref 0.5–1.05)
CREAT UR-MCNC: 161 MG/DL (ref 20–275)
GFR/BSA.PRED SERPLBLD CYS-BASED-ARV: 100 ML/MIN/1.73M2
GLUCOSE SERPL-MCNC: 102 MG/DL (ref 65–99)
HBA1C MFR BLD: 5.9 % OF TOTAL HGB
MICROALBUMIN UR-MCNC: 2.6 MG/DL
POTASSIUM SERPL-SCNC: 4.2 MMOL/L (ref 3.5–5.3)
SODIUM SERPL-SCNC: 142 MMOL/L (ref 135–146)
TSH SERPL-ACNC: 1.99 MIU/L (ref 0.4–4.5)

## 2025-02-10 ENCOUNTER — TELEPHONE (OUTPATIENT)
Age: 65
End: 2025-02-10

## 2025-02-10 NOTE — TELEPHONE ENCOUNTER
Pt has a question about what is [facility] and how is it used. Her insurance company now uses [facility] and would like some more information. Please advise.

## 2025-02-25 DIAGNOSIS — Z79.4 TYPE 2 DIABETES MELLITUS WITHOUT COMPLICATION, WITH LONG-TERM CURRENT USE OF INSULIN (HCC): ICD-10-CM

## 2025-02-25 DIAGNOSIS — E11.9 TYPE 2 DIABETES MELLITUS WITHOUT COMPLICATION, WITH LONG-TERM CURRENT USE OF INSULIN (HCC): ICD-10-CM

## 2025-02-25 NOTE — TELEPHONE ENCOUNTER
Reason for call:   [x] Refill   [] Prior Auth  [] Other:     Office:   [] PCP/Provider -   [x] Specialty/Provider -     Medication: semaglutide, 0.25 or 0.5 mg/dose, (Ozempic, 0.25 or 0.5 MG/DOSE,) 2 mg/1.5 mL injection pen     Dose/Frequency: Inject 0.38 mL (0.5 mg total) under the skin every 7 days     Quantity:  9 mL     Pharmacy: [facility] - [address]      Does the patient have enough for 3 days?   [x] Yes   [] No - Send as HP to POD

## 2025-03-26 DIAGNOSIS — Z79.4 TYPE 2 DIABETES MELLITUS WITHOUT COMPLICATION, WITH LONG-TERM CURRENT USE OF INSULIN (HCC): ICD-10-CM

## 2025-03-26 DIAGNOSIS — E11.9 TYPE 2 DIABETES MELLITUS WITHOUT COMPLICATION, WITH LONG-TERM CURRENT USE OF INSULIN (HCC): ICD-10-CM

## 2025-04-01 NOTE — PROGRESS NOTES
Name: Nunu Hargrove      : 1960      MRN: [id number]  Encounter Provider: Rose Marie Pacheco PA-C  Encounter Date: 2025   Encounter department: [facility] [place]    Chief Complaint   Patient presents with   • Diabetes Type 2   :  Assessment & Plan  Type 2 diabetes mellitus without complication, unspecified whether long term insulin use (HCC)    Lab Results   Component Value Date    HGBA1C 5.9 (H) 2025   Current HbA1c represents improved control. Blood sugars demonstrating excellent control likely due to Ozempic and dietary modifications.   Continue current regimen  Advised to adhere to diabetic diet, and recommended staying active/exercising routinely.  Discussed symptoms and treatment of hypoglycemia.    Recommended routine follow-up with Ophthalmology and foot exams.   Ordered blood work to complete prior to next visit.    Orders:  •  Hemoglobin A1C; Future  •  Comprehensive metabolic panel; Future  •  Albumin / creatinine urine ratio; Future  •  Lipid panel; Future    Essential (primary) hypertension  BP at goal.   Continue current medication regimen.         Hyperlipidemia, unspecified hyperlipidemia type  Continue  Repatha   Check routine lipid panel.         Type 2 diabetes mellitus without complication, with long-term current use of insulin (HCC)    Lab Results   Component Value Date    HGBA1C 5.9 (H) 2025     Orders:  •  semaglutide, 0.25 or 0.5 mg/dose, (Ozempic, 0.25 or 0.5 MG/DOSE,) 2 mg/3 mL injection pen; Inject 0.75 mL (0.5 mg total) under the skin every 7 days  •  Continuous Glucose Sensor (FreeStyle Jerod 2 Sensor) MISC; Use 1 each every 14 (fourteen) days        History of Present Illness {?Quick Links Encounters * My Last Note * Last Note in Specialty * Snapshot * Since Last Visit * History :44484}  History of Present Illness  Nunu Hargrove is a 64-year-old female with a past medical history of type 2 diabetes who presents for  follow-up.    She reports overall good health since her last visit, with the exception of needing medication refills. She has observed fluctuations in her weight, ranging from 134 to 136 pounds, and questions the normalcy of this variation. Her appetite has decreased, leading to a reduction in her food intake. She is currently on Ozempic 0.5, which she tolerates well without any adverse effects such as nausea, vomiting, or diarrhea.     She experiences constipation, a long-standing issue for which she consults a gastroenterologist. The constipation has not been exacerbated by Ozempic. To manage this condition, she takes fiber gummies and was previously on Linzess, which she has since run out of.          CGM Interpretation:  Date Range: 3/19 - 4/1  Device used: FX Bridge  Home use   Indication: Type 2 Diabetes  Analysis of data:   Average Glucose: 112  GMI: 6.0%  Glucose Variability: 18.5%  Time in Target Range: 99%   Time Above Range: 1% high, 0% very high  Time Below Range: 0% low, 0% very low  Interpretation of data: Well controlled overall  More than 72 hours of data was reviewed. Report to be scanned to chart.     Current regimen:   Ozempic 0.5mg weekly      Last Eye Exam: Not on file  Last Foot Exam: 06/12/2024  Health Maintenance   Topic Date Due   • Diabetic Eye Exam  Never done   • Diabetic Foot Exam  06/12/2025         Review of Systems as per HPI         Medical History Reviewed by provider this encounter:     .    Objective {?Quick Links Trend Vitals * Enter New Vitals * Results Review * Timeline (Adult) * Labs * Imaging * Cardiology * Procedures * Lung Cancer Screening * Surgical eConsent :83351}  /80 (BP Location: Left arm, Patient Position: Sitting, Cuff Size: Adult)   Pulse 89   Ht 5' (1.524 m)   Wt 62.6 kg (138 lb)   BMI 26.95 kg/m²      Body mass index is 26.95 kg/m².  Wt Readings from Last 3 Encounters:   04/02/25 62.6 kg (138 lb)   09/17/24 64.9 kg (143 lb)   06/12/24 64.8 kg (142 lb 12.8  oz)     Physical Exam    Physical Exam  Vitals and nursing note reviewed.   Constitutional:       General: She is not in acute distress.     Appearance: She is well-developed.   HENT:      Head: Normocephalic and atraumatic.   Eyes:      General: No scleral icterus.     Conjunctiva/sclera: Conjunctivae normal.   Pulmonary:      Effort: Pulmonary effort is normal.   Neurological:      Mental Status: She is alert.   Psychiatric:         Attention and Perception: Attention normal.       Results    Labs:   Lab Results   Component Value Date    HGBA1C 5.9 (H) 01/08/2025    HGBA1C 6.8 (H) 09/13/2024    HGBA1C 6.8 (H) 09/13/2024     Lab Results   Component Value Date    CREATININE 0.63 01/08/2025    CREATININE 0.62 09/13/2024    CREATININE 0.62 09/13/2024    BUN 15 01/08/2025     10/07/2015    K 4.2 01/08/2025     01/08/2025    CO2 31 01/08/2025     GFR, Calculated   Date Value Ref Range Status   07/13/2020 100 >60 mL/min/1.73m2 Final     Comment:     mL/min per 1.73 square meters                                            Normal Function or Mild Renal    Disease (if clinically at risk):  >or=60  Moderately Decreased:                30-59  Severely Decreased:                  15-29  Renal Failure:                         <15                                            -American GFR: multiply reported GFR by 1.16    Please note that the eGFR is based on the CKD-EPI calculation, and is not intended to be used for drug dosing.                                            Note: Calculated GFR may not be an accurate indicator of renal function if the patient's renal function is not in a steady state.     eGFRcr   Date Value Ref Range Status   09/13/2024 100 >59 Final     eGFR   Date Value Ref Range Status   01/08/2025 100 > OR = 60 mL/min/1.73m2 Final     Lab Results   Component Value Date    HDL 49 10/26/2019    TRIG 142 10/26/2019     Lab Results   Component Value Date     (H) 09/13/2024     (H)  "09/13/2024     (H) 09/13/2024     (H) 09/13/2024    ALKPHOS 118 09/13/2024    ALKPHOS 118 09/13/2024    BILITOT 0.28 10/07/2015     No results found for: \"[id number]\"    Patient Instructions   Continue your medications  Monitor blood sugars regularly  Contact the office with any severe hypoglycemic or hyperglycemic events  Maintain appropriate diet and physical activity  Follow up in 6 months  Call with any questions, concerns, or refill requests  Obtain labs prior to your next appointment    Discussed with the patient and all questioned fully answered. She will call me if any problems arise.      "

## 2025-04-02 ENCOUNTER — OFFICE VISIT (OUTPATIENT)
Dept: ENDOCRINOLOGY | Facility: CLINIC | Age: 65
End: 2025-04-02
Payer: COMMERCIAL

## 2025-04-02 VITALS
BODY MASS INDEX: 27.09 KG/M2 | DIASTOLIC BLOOD PRESSURE: 80 MMHG | HEIGHT: 60 IN | SYSTOLIC BLOOD PRESSURE: 120 MMHG | WEIGHT: 138 LBS | HEART RATE: 89 BPM

## 2025-04-02 DIAGNOSIS — Z79.4 TYPE 2 DIABETES MELLITUS WITHOUT COMPLICATION, WITH LONG-TERM CURRENT USE OF INSULIN (HCC): ICD-10-CM

## 2025-04-02 DIAGNOSIS — E11.9 TYPE 2 DIABETES MELLITUS WITHOUT COMPLICATION, WITH LONG-TERM CURRENT USE OF INSULIN (HCC): ICD-10-CM

## 2025-04-02 DIAGNOSIS — I10 ESSENTIAL (PRIMARY) HYPERTENSION: ICD-10-CM

## 2025-04-02 DIAGNOSIS — E11.9 TYPE 2 DIABETES MELLITUS WITHOUT COMPLICATION, UNSPECIFIED WHETHER LONG TERM INSULIN USE (HCC): Primary | ICD-10-CM

## 2025-04-02 DIAGNOSIS — E78.5 HYPERLIPIDEMIA, UNSPECIFIED HYPERLIPIDEMIA TYPE: ICD-10-CM

## 2025-04-02 PROCEDURE — 99214 OFFICE O/P EST MOD 30 MIN: CPT | Performed by: PHYSICIAN ASSISTANT

## 2025-04-02 PROCEDURE — 95251 CONT GLUC MNTR ANALYSIS I&R: CPT | Performed by: PHYSICIAN ASSISTANT

## 2025-04-02 RX ORDER — FLUTICASONE PROPIONATE 50 MCG
2 SPRAY, SUSPENSION (ML) NASAL DAILY
COMMUNITY
Start: 2024-10-08

## 2025-04-02 NOTE — PATIENT INSTRUCTIONS
Continue your medications  Monitor blood sugars regularly  Contact the office with any severe hypoglycemic or hyperglycemic events  Maintain appropriate diet and physical activity  Follow up in 6 months  Call with any questions, concerns, or refill requests  Obtain labs prior to your next appointment

## 2025-04-02 NOTE — ASSESSMENT & PLAN NOTE
Lab Results   Component Value Date    HGBA1C 5.9 (H) 01/08/2025     Orders:  •  semaglutide, 0.25 or 0.5 mg/dose, (Ozempic, 0.25 or 0.5 MG/DOSE,) 2 mg/3 mL injection pen; Inject 0.75 mL (0.5 mg total) under the skin every 7 days  •  Continuous Glucose Sensor (FreeStyle Jerod 2 Sensor) MISC; Use 1 each every 14 (fourteen) days

## 2025-06-16 ENCOUNTER — TELEPHONE (OUTPATIENT)
Age: 65
End: 2025-06-16

## 2025-06-16 ENCOUNTER — TELEPHONE (OUTPATIENT)
Dept: ENDOCRINOLOGY | Facility: CLINIC | Age: 65
End: 2025-06-16

## 2025-06-16 DIAGNOSIS — E11.9 TYPE 2 DIABETES MELLITUS WITHOUT COMPLICATION, UNSPECIFIED WHETHER LONG TERM INSULIN USE (HCC): Primary | ICD-10-CM

## 2025-06-16 RX ORDER — HYDROCHLOROTHIAZIDE 12.5 MG/1
CAPSULE ORAL
Qty: 2 EACH | Refills: 3 | Status: SHIPPED | OUTPATIENT
Start: 2025-06-16 | End: 2025-06-18

## 2025-06-16 RX ORDER — KETOROLAC TROMETHAMINE 30 MG/ML
INJECTION, SOLUTION INTRAMUSCULAR; INTRAVENOUS
Qty: 1 EACH | Refills: 0 | Status: SHIPPED | OUTPATIENT
Start: 2025-06-16

## 2025-06-16 NOTE — TELEPHONE ENCOUNTER
Patient called the RX Refill Line. Message is being forwarded to the office.     Patient received a letter from her insurance that she can no longer get the Jerod 2. She needs to be switched to the Jerod 3 plus with the monitor.    Please review and send to [employer] [employer]     Please contact patient at [phone]

## 2025-06-16 NOTE — TELEPHONE ENCOUNTER
PA for FREESTYLE JAVIER 2 SENSOR NOT REQUIRED     Reason (screenshot if applicable)        Patient advised by          [] MyChart Message  [x] Phone call   [x]LMOM  []L/M to call office as no active Communication consent on file  []Unable to leave detailed message as VM not approved on Communication consent       Pharmacy advised by    [x]Fax  []Phone call

## 2025-06-16 NOTE — TELEPHONE ENCOUNTER
PA for Freestyle fidencio 2 sensor SUBMITTED to [facility]    via    [x]CMM-KEY: [id number]  []Surescripts-Case ID #   []Availity-Auth ID # NDC #   []Faxed to plan   []Other website   []Phone call Case ID #     [x]PA sent as URGENT    All office notes, labs and other pertaining documents and studies sent. Clinical questions answered. Awaiting determination from insurance company.     Turnaround time for your insurance to make a decision on your Prior Authorization can take 7-21 business days.

## 2025-06-17 DIAGNOSIS — E11.9 TYPE 2 DIABETES MELLITUS WITHOUT COMPLICATION, UNSPECIFIED WHETHER LONG TERM INSULIN USE (HCC): ICD-10-CM

## 2025-06-17 RX ORDER — HYDROCHLOROTHIAZIDE 12.5 MG/1
CAPSULE ORAL
Qty: 2 EACH | Refills: 3 | OUTPATIENT
Start: 2025-06-17

## 2025-06-18 DIAGNOSIS — E11.9 TYPE 2 DIABETES MELLITUS WITHOUT COMPLICATION, UNSPECIFIED WHETHER LONG TERM INSULIN USE (HCC): ICD-10-CM

## 2025-06-18 RX ORDER — HYDROCHLOROTHIAZIDE 12.5 MG/1
CAPSULE ORAL
Qty: 2 EACH | Refills: 3 | Status: SHIPPED | OUTPATIENT
Start: 2025-06-18 | End: 2025-06-20 | Stop reason: SDUPTHER

## 2025-06-18 NOTE — TELEPHONE ENCOUNTER
[facility] called the RX Refill Line. Message is being forwarded to the office.     [facility] is requesting a new prescription for Continuous Glucose Sensor (FreeStyle Jerod 3 Plus Sensor) Pharmacy stated that the directions must state every 15 days. Please review.

## 2025-06-20 RX ORDER — HYDROCHLOROTHIAZIDE 12.5 MG/1
CAPSULE ORAL
Qty: 6 EACH | Refills: 0 | Status: SHIPPED | OUTPATIENT
Start: 2025-06-20

## 2025-06-20 NOTE — TELEPHONE ENCOUNTER
Prescription was sent to incorrect pharmacy, patient is asking for it to please be resent to [facility].

## 2025-07-02 DIAGNOSIS — E11.9 TYPE 2 DIABETES MELLITUS WITHOUT COMPLICATION, WITH LONG-TERM CURRENT USE OF INSULIN (HCC): ICD-10-CM

## 2025-07-02 DIAGNOSIS — Z79.4 TYPE 2 DIABETES MELLITUS WITHOUT COMPLICATION, WITH LONG-TERM CURRENT USE OF INSULIN (HCC): ICD-10-CM

## 2025-07-02 DIAGNOSIS — E11.9 TYPE 2 DIABETES MELLITUS WITHOUT COMPLICATION, UNSPECIFIED WHETHER LONG TERM INSULIN USE (HCC): ICD-10-CM

## 2025-07-02 NOTE — TELEPHONE ENCOUNTER
Patient needs refills   Please send to [email or web address] mail order   Cgm sensor freestyle fidencio 3  Ozempic.

## 2025-07-03 RX ORDER — HYDROCHLOROTHIAZIDE 12.5 MG/1
CAPSULE ORAL
Qty: 6 EACH | Refills: 1 | Status: SHIPPED | OUTPATIENT
Start: 2025-07-03